# Patient Record
Sex: MALE | Race: WHITE | Employment: UNEMPLOYED | URBAN - METROPOLITAN AREA
[De-identification: names, ages, dates, MRNs, and addresses within clinical notes are randomized per-mention and may not be internally consistent; named-entity substitution may affect disease eponyms.]

---

## 2017-10-16 ENCOUNTER — APPOINTMENT (OUTPATIENT)
Dept: GENERAL RADIOLOGY | Age: 27
End: 2017-10-16
Attending: EMERGENCY MEDICINE
Payer: COMMERCIAL

## 2017-10-16 ENCOUNTER — OFFICE VISIT (OUTPATIENT)
Dept: INTERNAL MEDICINE CLINIC | Age: 27
End: 2017-10-16

## 2017-10-16 ENCOUNTER — HOSPITAL ENCOUNTER (EMERGENCY)
Age: 27
Discharge: HOME | End: 2017-10-16
Attending: EMERGENCY MEDICINE
Payer: COMMERCIAL

## 2017-10-16 VITALS
HEIGHT: 67 IN | DIASTOLIC BLOOD PRESSURE: 71 MMHG | RESPIRATION RATE: 21 BRPM | HEART RATE: 95 BPM | OXYGEN SATURATION: 99 % | TEMPERATURE: 102.8 F | WEIGHT: 217 LBS | BODY MASS INDEX: 34.06 KG/M2 | SYSTOLIC BLOOD PRESSURE: 123 MMHG

## 2017-10-16 VITALS
SYSTOLIC BLOOD PRESSURE: 124 MMHG | HEIGHT: 68 IN | DIASTOLIC BLOOD PRESSURE: 70 MMHG | WEIGHT: 217 LBS | BODY MASS INDEX: 32.89 KG/M2 | HEART RATE: 105 BPM | OXYGEN SATURATION: 99 % | TEMPERATURE: 98.6 F

## 2017-10-16 DIAGNOSIS — T78.40XA ALLERGIC REACTION, INITIAL ENCOUNTER: Primary | ICD-10-CM

## 2017-10-16 DIAGNOSIS — R04.0 EPISTAXIS: ICD-10-CM

## 2017-10-16 DIAGNOSIS — J06.9 UPPER RESPIRATORY TRACT INFECTION, UNSPECIFIED TYPE: Primary | ICD-10-CM

## 2017-10-16 PROBLEM — F95.2 TOURETTE'S: Status: ACTIVE | Noted: 2017-10-16

## 2017-10-16 PROBLEM — F31.9 BIPOLAR DISORDER (HCC): Status: ACTIVE | Noted: 2017-10-16

## 2017-10-16 PROBLEM — G47.00 INSOMNIA: Status: ACTIVE | Noted: 2017-10-16

## 2017-10-16 PROBLEM — H60.91 OTITIS EXTERNA OF RIGHT EAR: Status: ACTIVE | Noted: 2017-10-16

## 2017-10-16 PROBLEM — J30.9 ALLERGIC RHINITIS: Status: ACTIVE | Noted: 2017-10-16

## 2017-10-16 LAB
ALBUMIN SERPL-MCNC: 4.5 G/DL (ref 3.5–5)
ALBUMIN/GLOB SERPL: 1.2 {RATIO} (ref 1.1–2.2)
ALP SERPL-CCNC: 109 U/L (ref 45–117)
ALT SERPL-CCNC: 48 U/L (ref 12–78)
ANION GAP SERPL CALC-SCNC: 10 MMOL/L (ref 5–15)
APPEARANCE UR: CLEAR
AST SERPL-CCNC: 32 U/L (ref 15–37)
BASOPHILS # BLD: 0.1 K/UL (ref 0–0.1)
BASOPHILS NFR BLD: 0 % (ref 0–1)
BILIRUB SERPL-MCNC: 0.9 MG/DL (ref 0.2–1)
BILIRUB UR QL: NEGATIVE
BUN SERPL-MCNC: 9 MG/DL (ref 6–20)
BUN/CREAT SERPL: 8 (ref 12–20)
CALCIUM SERPL-MCNC: 9.7 MG/DL (ref 8.5–10.1)
CHLORIDE SERPL-SCNC: 101 MMOL/L (ref 97–108)
CO2 SERPL-SCNC: 28 MMOL/L (ref 21–32)
COLOR UR: NORMAL
CREAT SERPL-MCNC: 1.16 MG/DL (ref 0.7–1.3)
EOSINOPHIL # BLD: 0.2 K/UL (ref 0–0.4)
EOSINOPHIL NFR BLD: 2 % (ref 0–7)
ERYTHROCYTE [DISTWIDTH] IN BLOOD BY AUTOMATED COUNT: 12.4 % (ref 11.5–14.5)
FLUAV AG NPH QL IA: NEGATIVE
FLUBV AG NOSE QL IA: NEGATIVE
GLOBULIN SER CALC-MCNC: 3.8 G/DL (ref 2–4)
GLUCOSE SERPL-MCNC: 161 MG/DL (ref 65–100)
GLUCOSE UR STRIP.AUTO-MCNC: NEGATIVE MG/DL
HCT VFR BLD AUTO: 45.9 % (ref 36.6–50.3)
HGB BLD-MCNC: 16.3 G/DL (ref 12.1–17)
HGB UR QL STRIP: NEGATIVE
KETONES UR QL STRIP.AUTO: NEGATIVE MG/DL
LACTATE SERPL-SCNC: 2 MMOL/L (ref 0.4–2)
LEUKOCYTE ESTERASE UR QL STRIP.AUTO: NEGATIVE
LYMPHOCYTES # BLD: 2.7 K/UL (ref 0.8–3.5)
LYMPHOCYTES NFR BLD: 22 % (ref 12–49)
MCH RBC QN AUTO: 31 PG (ref 26–34)
MCHC RBC AUTO-ENTMCNC: 35.5 G/DL (ref 30–36.5)
MCV RBC AUTO: 87.3 FL (ref 80–99)
MONOCYTES # BLD: 0.9 K/UL (ref 0–1)
MONOCYTES NFR BLD: 7 % (ref 5–13)
NEUTS SEG # BLD: 8.7 K/UL (ref 1.8–8)
NEUTS SEG NFR BLD: 69 % (ref 32–75)
NITRITE UR QL STRIP.AUTO: NEGATIVE
PH UR STRIP: 6 [PH] (ref 5–8)
PLATELET # BLD AUTO: 339 K/UL (ref 150–400)
POTASSIUM SERPL-SCNC: 3 MMOL/L (ref 3.5–5.1)
PROT SERPL-MCNC: 8.3 G/DL (ref 6.4–8.2)
PROT UR STRIP-MCNC: NEGATIVE MG/DL
RBC # BLD AUTO: 5.26 M/UL (ref 4.1–5.7)
SODIUM SERPL-SCNC: 139 MMOL/L (ref 136–145)
SP GR UR REFRACTOMETRY: 1.01 (ref 1–1.03)
UROBILINOGEN UR QL STRIP.AUTO: 1 EU/DL (ref 0.2–1)
WBC # BLD AUTO: 12.6 K/UL (ref 4.1–11.1)

## 2017-10-16 PROCEDURE — 74011250636 HC RX REV CODE- 250/636: Performed by: EMERGENCY MEDICINE

## 2017-10-16 PROCEDURE — 81003 URINALYSIS AUTO W/O SCOPE: CPT | Performed by: EMERGENCY MEDICINE

## 2017-10-16 PROCEDURE — 99284 EMERGENCY DEPT VISIT MOD MDM: CPT

## 2017-10-16 PROCEDURE — 87804 INFLUENZA ASSAY W/OPTIC: CPT | Performed by: EMERGENCY MEDICINE

## 2017-10-16 PROCEDURE — 74011636637 HC RX REV CODE- 636/637: Performed by: EMERGENCY MEDICINE

## 2017-10-16 PROCEDURE — 83605 ASSAY OF LACTIC ACID: CPT | Performed by: EMERGENCY MEDICINE

## 2017-10-16 PROCEDURE — 36415 COLL VENOUS BLD VENIPUNCTURE: CPT | Performed by: EMERGENCY MEDICINE

## 2017-10-16 PROCEDURE — 87040 BLOOD CULTURE FOR BACTERIA: CPT | Performed by: EMERGENCY MEDICINE

## 2017-10-16 PROCEDURE — 80053 COMPREHEN METABOLIC PANEL: CPT | Performed by: EMERGENCY MEDICINE

## 2017-10-16 PROCEDURE — 93005 ELECTROCARDIOGRAM TRACING: CPT

## 2017-10-16 PROCEDURE — 74011250637 HC RX REV CODE- 250/637: Performed by: EMERGENCY MEDICINE

## 2017-10-16 PROCEDURE — 85025 COMPLETE CBC W/AUTO DIFF WBC: CPT | Performed by: EMERGENCY MEDICINE

## 2017-10-16 PROCEDURE — 71010 XR CHEST PORT: CPT

## 2017-10-16 PROCEDURE — 96360 HYDRATION IV INFUSION INIT: CPT

## 2017-10-16 RX ORDER — ACETAMINOPHEN 325 MG/1
650 TABLET ORAL
Status: COMPLETED | OUTPATIENT
Start: 2017-10-16 | End: 2017-10-16

## 2017-10-16 RX ORDER — PIMOZIDE 2 MG/1
1 TABLET ORAL DAILY
COMMUNITY
Start: 2017-07-17 | End: 2017-10-16 | Stop reason: SDUPTHER

## 2017-10-16 RX ORDER — SULFAMETHOXAZOLE AND TRIMETHOPRIM 400; 80 MG/1; MG/1
1 TABLET ORAL 2 TIMES DAILY
Qty: 14 TAB | Refills: 0 | Status: SHIPPED | OUTPATIENT
Start: 2017-10-16 | End: 2018-01-24 | Stop reason: SINTOL

## 2017-10-16 RX ORDER — GUANFACINE 4 MG/1
1 TABLET, EXTENDED RELEASE ORAL DAILY
COMMUNITY
Start: 2017-07-17 | End: 2021-09-30

## 2017-10-16 RX ORDER — PREDNISONE 20 MG/1
60 TABLET ORAL
Status: COMPLETED | OUTPATIENT
Start: 2017-10-16 | End: 2017-10-16

## 2017-10-16 RX ORDER — METHYLPREDNISOLONE 4 MG/1
TABLET ORAL
Qty: 1 DOSE PACK | Refills: 0 | Status: SHIPPED | OUTPATIENT
Start: 2017-10-16 | End: 2017-10-30 | Stop reason: SDUPTHER

## 2017-10-16 RX ORDER — DOXYCYCLINE HYCLATE 100 MG
100 TABLET ORAL 2 TIMES DAILY
Qty: 14 TAB | Refills: 0 | Status: SHIPPED | OUTPATIENT
Start: 2017-10-16 | End: 2017-10-23

## 2017-10-16 RX ORDER — SODIUM CHLORIDE 0.9 % (FLUSH) 0.9 %
5-10 SYRINGE (ML) INJECTION AS NEEDED
Status: DISCONTINUED | OUTPATIENT
Start: 2017-10-16 | End: 2017-10-16 | Stop reason: HOSPADM

## 2017-10-16 RX ADMIN — Medication 2 SPRAY: at 15:45

## 2017-10-16 RX ADMIN — PREDNISONE 60 MG: 20 TABLET ORAL at 14:14

## 2017-10-16 RX ADMIN — SODIUM CHLORIDE 2952 ML: 900 INJECTION, SOLUTION INTRAVENOUS at 14:14

## 2017-10-16 RX ADMIN — ACETAMINOPHEN 650 MG: 325 TABLET ORAL at 15:45

## 2017-10-16 NOTE — ED PROVIDER NOTES
[facility]  EMERGENCY DEPARTMENT HISTORY AND PHYSICAL EXAM       Date of Service: 10/16/2017   Patient Name: Ivy Ahn   YOB: 1990  Medical Record Number: [id number]    History of Presenting Illness     No chief complaint on file. History Provided By:  patient and EMS    Additional History:   Ivy Ahn is a 32 y.o. male with PMhx significant for Tourette's, ADHD and bipolar disorder who presents via EMS to the ED with NRB in place with cc of SOB, hives, cough, and throat swelling after taking a new abx at 10:30 AM. Pt was placed on Lorabid by his PCP for fever and other cold sxs. He did not have a CXR performed while at the office. Pt reports taking 2 tablets of Benadryl and used an albuterol inhaler at onset. EMS reports tachycardia, hives on both sides of his sternum, and difficulty breathing. EMS administered benadryl and 0.3 mg of epi en route with improvement of difficulty swallowing from 7/10 to 2/10. EMS reports stridor initially that resolved en route. He was also tachycardic initially at 137 bpm and has now lowered to 117 bpm.  He specifically denies any fevers, chills, nausea, vomiting, chest pain,  headache, rash, diarrhea, sweating or weight loss. Social Hx: - Tobacco, + EtOH, - Illicit Drugs    There are no other complaints, changes or physical findings at this time.     Primary Care Provider: Tina Wilder MD     Past History     Past Medical History:   Past Medical History:   Diagnosis Date    ADHD (attention deficit hyperactivity disorder)     Allergic rhinitis 10/16/2017    Bipolar disorder ([facility] [address] 10/16/2017    Insomnia 10/16/2017    Otitis externa of right ear 10/16/2017    Tourette's 10/16/2017        Past Surgical History:   Past Surgical History:   Procedure Laterality Date    HX HEENT      HX TONSIL AND ADENOIDECTOMY          Family History:   Family History   Problem Relation Age of Onset    Heart Disease Mother     No Known Problems Father         Social History:   Social History   Substance Use Topics    Smoking status: Never Smoker    Smokeless tobacco: Never Used    Alcohol use Yes        Allergies: Allergies   Allergen Reactions    Bactrim [Sulfamethoxazole-Trimethoprim] Anaphylaxis     Patient given Bactrim DS this morning for sinus infection. Sister called this afternoon patient is in ER with severe reaction; Dr. Hauser Reasons notified   Luke Powers [Loracarbef] Hives         Review of Systems   Review of Systems   Constitutional: Negative. Negative for activity change, appetite change, chills, fatigue, fever and unexpected weight change. HENT: Positive for nosebleeds and trouble swallowing. Negative for congestion, hearing loss, rhinorrhea, sneezing and voice change. Eyes: Negative. Negative for pain and visual disturbance. Respiratory: Positive for shortness of breath. Negative for apnea, cough, choking and chest tightness. Cardiovascular: Negative. Negative for chest pain and palpitations. Gastrointestinal: Negative. Negative for abdominal distention, abdominal pain, blood in stool, diarrhea, nausea and vomiting. Genitourinary: Negative. Negative for difficulty urinating, flank pain, frequency and urgency. No discharge   Musculoskeletal: Negative. Negative for arthralgias, back pain, myalgias and neck stiffness. Skin: Negative for color change and rash. (+) Hives   Neurological: Negative. Negative for dizziness, seizures, syncope, speech difficulty, weakness, numbness and headaches. Hematological: Negative for adenopathy. Psychiatric/Behavioral: Negative. Negative for agitation, behavioral problems, dysphoric mood and suicidal ideas. The patient is not nervous/anxious. Physical Exam  Physical Exam   Constitutional: He is oriented to person, place, and time. He appears well-developed and well-nourished. No distress. HENT:   Head: Normocephalic and atraumatic. Mouth/Throat: Oropharynx is clear and moist. No oropharyngeal exudate. Dried blood on R nare   Eyes: Conjunctivae and EOM are normal. Pupils are equal, round, and reactive to light. Right eye exhibits no discharge. Left eye exhibits no discharge. Neck: Normal range of motion. Neck supple. Cardiovascular: Regular rhythm and intact distal pulses. Tachycardia present. Exam reveals no gallop and no friction rub. No murmur heard. Pulmonary/Chest: Effort normal and breath sounds normal. No respiratory distress. He has no wheezes. He has no rales. He exhibits no tenderness. Abdominal: Soft. Bowel sounds are normal. He exhibits no distension and no mass. There is no tenderness. There is no rebound and no guarding. Musculoskeletal: Normal range of motion. He exhibits no edema. Lymphadenopathy:     He has no cervical adenopathy. Neurological: He is alert and oriented to person, place, and time. No cranial nerve deficit. Coordination normal.   Skin: Skin is warm and dry. No rash noted. No erythema. Hives are resolving   Psychiatric: He has a normal mood and affect. Nursing note and vitals reviewed. Medical Decision Making   I am the first provider for this patient. I reviewed the vital signs, available nursing notes, past medical history, past surgical history, family history and social history. Old Medical Records: Old medical records. Provider Notes:   DDX: allergic reaction, epistaxis      ED Course:  1:58 PM   Initial assessment performed. The patients presenting problems have been discussed, and they are in agreement with the care plan formulated and outlined with them. I have encouraged them to ask questions as they arise throughout their visit. Progress Notes:   3:30 PM  The patient states that their symptoms have resolved and they feel much better. There are no other new complaints at this time. His questions have been answered.   We are awaiting final results and those will be reviewed with them when they become available. 5:04  PM  The patient states that their symptoms have resolved and they feel much better. There are no other new complaints at this time. His questions have been answered. We are awaiting final results and those will be reviewed with them when they become available. Diagnostic Study Results     Labs -      Recent Results (from the past 12 hour(s))   EKG, 12 LEAD, INITIAL    Collection Time: 10/16/17  2:19 PM   Result Value Ref Range    Ventricular Rate 113 BPM    Atrial Rate 113 BPM    P-R Interval 126 ms    QRS Duration 78 ms    Q-T Interval 330 ms    QTC Calculation (Bezet) 452 ms    Calculated P Axis 34 degrees    Calculated R Axis 13 degrees    Calculated T Axis 16 degrees    Diagnosis Sinus tachycardia  No previous ECGs available      LACTIC ACID    Collection Time: 10/16/17  2:38 PM   Result Value Ref Range    Lactic acid 2.0 0.4 - 2.0 MMOL/L   METABOLIC PANEL, COMPREHENSIVE    Collection Time: 10/16/17  2:38 PM   Result Value Ref Range    Sodium 139 136 - 145 mmol/L    Potassium 3.0 (L) 3.5 - 5.1 mmol/L    Chloride 101 97 - 108 mmol/L    CO2 28 21 - 32 mmol/L    Anion gap 10 5 - 15 mmol/L    Glucose 161 (H) 65 - 100 mg/dL    BUN 9 6 - 20 MG/DL    Creatinine 1.16 0.70 - 1.30 MG/DL    BUN/Creatinine ratio 8 (L) 12 - 20      GFR est AA >60 >60 ml/min/1.73m2    GFR est non-AA >60 >60 ml/min/1.73m2    Calcium 9.7 8.5 - 10.1 MG/DL    Bilirubin, total 0.9 0.2 - 1.0 MG/DL    ALT (SGPT) 48 12 - 78 U/L    AST (SGOT) 32 15 - 37 U/L    Alk.  phosphatase 109 45 - 117 U/L    Protein, total 8.3 (H) 6.4 - 8.2 g/dL    Albumin 4.5 3.5 - 5.0 g/dL    Globulin 3.8 2.0 - 4.0 g/dL    A-G Ratio 1.2 1.1 - 2.2     CBC WITH AUTOMATED DIFF    Collection Time: 10/16/17  2:38 PM   Result Value Ref Range    WBC 12.6 (H) 4.1 - 11.1 K/uL    RBC 5.26 4.10 - 5.70 M/uL    HGB 16.3 12.1 - 17.0 g/dL    HCT 45.9 36.6 - 50.3 %    MCV 87.3 80.0 - 99.0 FL    MCH 31.0 26.0 - 34.0 PG MCHC 35.5 30.0 - 36.5 g/dL    RDW 12.4 11.5 - 14.5 %    PLATELET 913 626 - 445 K/uL    NEUTROPHILS 69 32 - 75 %    LYMPHOCYTES 22 12 - 49 %    MONOCYTES 7 5 - 13 %    EOSINOPHILS 2 0 - 7 %    BASOPHILS 0 0 - 1 %    ABS. NEUTROPHILS 8.7 (H) 1.8 - 8.0 K/UL    ABS. LYMPHOCYTES 2.7 0.8 - 3.5 K/UL    ABS. MONOCYTES 0.9 0.0 - 1.0 K/UL    ABS. EOSINOPHILS 0.2 0.0 - 0.4 K/UL    ABS. BASOPHILS 0.1 0.0 - 0.1 K/UL   URINALYSIS W/ RFLX MICROSCOPIC    Collection Time: 10/16/17  3:41 PM   Result Value Ref Range    Color YELLOW/STRAW      Appearance CLEAR CLEAR      Specific gravity 1.009 1.003 - 1.030      pH (UA) 6.0 5.0 - 8.0      Protein NEGATIVE  NEG mg/dL    Glucose NEGATIVE  NEG mg/dL    Ketone NEGATIVE  NEG mg/dL    Bilirubin NEGATIVE  NEG      Blood NEGATIVE  NEG      Urobilinogen 1.0 0.2 - 1.0 EU/dL    Nitrites NEGATIVE  NEG      Leukocyte Esterase NEGATIVE  NEG     INFLUENZA A & B AG (RAPID TEST)    Collection Time: 10/16/17  3:41 PM   Result Value Ref Range    Influenza A Antigen NEGATIVE  NEG      Influenza B Antigen NEGATIVE  NEG         Radiologic Studies -  The following have been ordered and reviewed:    CXR Results  (Last 48 hours)               10/16/17 1432  XR CHEST PORT Final result    Impression:  IMPRESSION: No acute cardiopulmonary process seen. Narrative:  EXAM:  XR CHEST PORT       INDICATION:  Chest Pain. Clinical allergic reaction. COMPARISON:  None. FINDINGS: A portable AP radiograph of the chest was obtained at 1417 hours. The   patient is on a cardiac monitor. The lungs are clear. The cardiac and   mediastinal contours and pulmonary vascularity are normal.  The bones and soft   tissues are grossly within normal limits. Vital Signs-Reviewed the patient's vital signs.    Patient Vitals for the past 12 hrs:   Temp Pulse Resp BP SpO2   10/16/17 1715 - 95 21 123/71 99 %   10/16/17 1407 (!) 102.8 °F (39.3 °C) (!) 118 18 169/79 100 %       Medications Given in the ED:  Medications   sodium chloride (NS) flush 5-10 mL (not administered)   predniSONE (DELTASONE) tablet 60 mg (60 mg Oral Given 10/16/17 1414)   sodium chloride 0.9 % bolus infusion 2,952 mL (0 mL/kg × 98.4 kg IntraVENous IV Completed 10/16/17 1540)   phenylephrine (NEOSYNEPHRINE) 1 % nasal spray 2 Spray (2 Sprays Both Nostrils Given 10/16/17 1545)   acetaminophen (TYLENOL) tablet 650 mg (650 mg Oral Given 10/16/17 1545)       Pulse Oximetry Analysis - Normal 100% on NRB     Cardiac Monitor:   Rate: 118  Rhythm: Sinus Tachycardia        Diagnosis   Clinical Impression:   1. Allergic reaction, initial encounter    2. Epistaxis         Plan:  1:   Follow-up Information     Follow up With Details Comments Contact Info    C Amalia Bermudez MD Call in 2 days As needed, If symptoms worsen [address]  [phone]          2:   Current Discharge Medication List      START taking these medications    Details   methylPREDNISolone (MEDROL, ERASMO,) 4 mg tablet Take as directed  Qty: 1 Dose Pack, Refills: 0      doxycycline (VIBRA-TABS) 100 mg tablet Take 1 Tab by mouth two (2) times a day for 7 days. Qty: 14 Tab, Refills: 0           Return to ED if Worse    Disposition Note:  5:19 PM  Emmanuelle Garduno  results have been reviewed with him. He has been counseled regarding his diagnosis. He verbally conveys understanding and agreement of the signs, symptoms, diagnosis, treatment and prognosis and additionally agrees to follow up as recommended with Dr. Romina Cowan MD in 24 - 48 hours. He also agrees with the care-plan and conveys that all of his questions have been answered.   I have also put together some discharge instructions for him that include: 1) educational information regarding their diagnosis, 2) how to care for their diagnosis at home, as well a 3) list of reasons why they would want to return to the ED prior to their follow-up appointment, should their condition change.    _______________________________   Attestations:     Attestations: This note is prepared by Ish Moran, acting as Scribe for Ring Katt. Marilu Brady MD      The scribe's documentation has been prepared under my direction and personally reviewed by me in its entirety. I confirm that the note above accurately reflects all work, treatment, procedures, and medical decision making performed by me. Jay Brady MD    _______________________________

## 2017-10-16 NOTE — PROGRESS NOTES
This note will not be viewable in [address] Isidoro Briones is a 32 y.o. male and presents with Cold Symptoms  . Subjective:  Mr. Julian Patterson presents today with complaint of coryza congestion and drainage for the past 3 days. He notes he may have had a fever yesterday. He has had no chills or rigors. He has mild cough that is nonproductive. He denies any shortness of breath or pleuritic chest pain. He is not taking any medication for this. Past Medical History:   Diagnosis Date    ADHD (attention deficit hyperactivity disorder)     Allergic rhinitis 10/16/2017    Bipolar disorder ([address] 10/16/2017    Insomnia 10/16/2017    Otitis externa of right ear 10/16/2017    Tourette's 10/16/2017     Past Surgical History:   Procedure Laterality Date    HX HEENT      HX TONSIL AND ADENOIDECTOMY       Allergies   Allergen Reactions    Bactrim [Sulfamethoxazole-Trimethoprim] Anaphylaxis     Patient given Bactrim DS this morning for sinus infection. Sister called this afternoon patient is in ER with severe reaction; Dr. Vishnu Mohamud notified   Reba Lykristie [Loracarbef] Hives    Cefdinir Unknown (comments)     Current Outpatient Prescriptions   Medication Sig Dispense Refill    guanFACINE ER (INTUNIV) 4 mg Tb24 ER tablet Take 1 Tab by mouth daily.  trimethoprim-sulfamethoxazole (BACTRIM, SEPTRA)  mg per tablet Take 1 Tab by mouth two (2) times a day. 14 Tab 0    pimozide (ORAP) 1 mg Tab Take 4 mg by mouth.  guanfacine (TENEX) 2 mg Tab Take 2 mg by mouth.  LORazepam (ATIVAN) 0.5 mg tablet Take 0.5 mg by mouth.  benztropine (COGENTIN) 0.5 mg tablet Take 0.5 mg by mouth two (2) times a day.  methylPREDNISolone (MEDROL, ERASMO,) 4 mg tablet Take as directed 1 Dose Pack 0    doxycycline (VIBRA-TABS) 100 mg tablet Take 1 Tab by mouth two (2) times a day for 7 days.  14 Tab 0     Facility-Administered Medications Ordered in Other Visits   Medication Dose Route Frequency Provider Last Rate Last Dose    sodium chloride (NS) flush 5-10 mL  5-10 mL IntraVENous PRN Reza Desai MD         Social History     Social History    Marital status: SINGLE     Spouse name: N/A    Number of children: N/A    Years of education: N/A     Social History Main Topics    Smoking status: Never Smoker    Smokeless tobacco: Never Used    Alcohol use Yes    Drug use: No    Sexual activity: Not Asked     Other Topics Concern    None     Social History Narrative     Family History   Problem Relation Age of Onset    Heart Disease Mother     No Known Problems Father        Review of Systems  Constitutional: negative for fevers, chills, anorexia and weight loss  Eyes:   negative for visual disturbance and irritation  ENT:   Positive for some sinus congestion and post nasal drainage. Respiratory:  Positive for cough and chest congestion without wheezing  CV:   negative for chest pain, palpitations, lower extremity edema  GI:   negative for nausea, vomiting, diarrhea, abdominal pain,melena  Integumentary: negative for rash and pruritus  Neurological:  negative for headaches, dizziness, vertigo, memory problems and gait       Objective:  Visit Vitals    /70 (BP 1 Location: Left arm, BP Patient Position: Sitting)    Pulse (!) 105    Temp 98.6 °F (37 °C)    Ht 5' 7.5\" (1.715 m)    Wt 217 lb (98.4 kg)    SpO2 99%    BMI 33.49 kg/m2     Physical Exam:   General appearance - alert, ill appearing, and in no distress  Mental status - alert, oriented to person, place, and time  EYE-HENRY, EOMI, conjuctiva clear. No lid swelling or purulent drainage  ENT- TM's clear without A/F level.  Pharynx slightly erythematous with drainage noted  Nose - normal and patent, no erythema,  Neck - supple, no significant adenopathy   Chest - Coarse upper airway rhonchi present without wheezing   Heart - normal rate, regular rhythm, normal S1, S2, no murmurs, rubs, clicks or gallops   Skin-No rash appreciated  Neuro -alert, oriented, normal speech, no focal findings. Assessment/Plan:  Diagnoses and all orders for this visit:    1. Upper respiratory tract infection, unspecified type    Other orders  -     trimethoprim-sulfamethoxazole (BACTRIM, SEPTRA)  mg per tablet; Take 1 Tab by mouth two (2) times a day. Other Instructions:  Mucinex as directed    Increase po fluids    Follow-up Disposition:  Return if symptoms worsen or fail to improve. I have reviewed with the patient details of the assessment and plan and all questions were answered. Relevent patient education was performed. An After Visit Summary was printed and given to the patient. Fan Knight MD    Addendum:    After the patient's visit today he apparently had a reaction to the Bactrim as above and went to the emergency room for evaluation. Further plan pending evaluation in the ER. His allergy was marked on the chart.

## 2017-10-16 NOTE — MR AVS SNAPSHOT
Visit Information Date & Time Provider Department Dept. Phone Encounter #  
 10/16/2017  9:40 AM ANUJ Cook MD [address] [place] [address] [phone] [id number] Follow-up Instructions Return if symptoms worsen or fail to improve. Upcoming Health Maintenance Date Due DTaP/Tdap/Td series (1 - Tdap) 6/26/2009 INFLUENZA AGE 9 TO ADULT 8/1/2017 Allergies as of 10/16/2017  Review Complete On: 10/16/2017 By: Charity Valdes MD  
  
 Severity Noted Reaction Type Reactions Lorabid [Loracarbef]  05/26/2012    Hives Current Immunizations  Never Reviewed No immunizations on file. Not reviewed this visit You Were Diagnosed With   
  
 Codes Comments Upper respiratory tract infection, unspecified type    -  Primary ICD-10-CM: J06.9 ICD-9-CM: 465.9 Vitals BP Pulse Temp Height(growth percentile) Weight(growth percentile) SpO2  
 124/70 (BP 1 Location: Left arm, BP Patient Position: Sitting) (!) 105 98.6 °F (37 °C) 5' 7.5\" (1.715 m) 217 lb (98.4 kg) 99% BMI Smoking Status 33.49 kg/m2 Never Smoker BMI and BSA Data Body Mass Index Body Surface Area  
 33.49 kg/m 2 2.16 m 2 Preferred Pharmacy Pharmacy Name Phone [address] [place], [address] [facility] [phone] Your Updated Medication List  
  
   
This list is accurate as of: 10/16/17 10:19 AM.  Always use your most recent med list.  
  
  
  
  
 benztropine 0.5 mg tablet Commonly known as:  COGENTIN Take 0.5 mg by mouth two (2) times a day. LORazepam 0.5 mg tablet Commonly known as:  ATIVAN Take 0.5 mg by mouth. ORAP 1 mg tablet Generic drug:  pimozide Take 4 mg by mouth. * guanFACINE ER 4 mg Tb24 ER tablet Commonly known as:  INTUNIV Take 1 Tab by mouth daily. * TENEX 2 mg IR tablet Generic drug:  guanFACINE IR Take 2 mg by mouth. trimethoprim-sulfamethoxazole  mg per tablet Commonly known as:  Elza Burn Take 1 Tab by mouth two (2) times a day. * Notice: This list has 2 medication(s) that are the same as other medications prescribed for you. Read the directions carefully, and ask your doctor or other care provider to review them with you. Prescriptions Sent to Pharmacy Refills  
 trimethoprim-sulfamethoxazole (BACTRIM, SEPTRA)  mg per tablet 0 Sig: Take 1 Tab by mouth two (2) times a day. Class: Normal  
 Pharmacy: [facility] [place], [address] [place] [address] Cari Oliverarini Ph #: [phone] Route: Oral  
  
Follow-up Instructions Return if symptoms worsen or fail to improve. Introducing [facility]! [employer] introduces [employer] patient portal. Now you can access parts of your medical record, email your doctor's office, and request medication refills online. 1. In your internet browser, go to [email or web address] 2. Click on the First Time User? Click Here link in the Sign In box. You will see the New Member Sign Up page. 3. Enter your [employer] Access Code exactly as it appears below. You will not need to use this code after youve completed the sign-up process. If you do not sign up before the expiration date, you must request a new code. · [employer] Access Code: [email or web address] Expires: 1/14/2018  9:20 AM 
 
4. Enter the last four digits of your Social Security Number (xxxx) and Date of Birth (mm/dd/yyyy) as indicated and click Submit. You will be taken to the next sign-up page. 5. Create a [employer] ID. This will be your [employer] login ID and cannot be changed, so think of one that is secure and easy to remember. 6. Create a [employer] password. You can change your password at any time. 7. Enter your Password Reset Question and Answer. This can be used at a later time if you forget your password. 8. Enter your e-mail address. You will receive e-mail notification when new information is available in [address] 9. Click Sign Up. You can now view and download portions of your medical record. 10. Click the Download Summary menu link to download a portable copy of your medical information. If you have questions, please visit the Frequently Asked Questions section of the [employer] website. Remember, [employer] is NOT to be used for urgent needs. For medical emergencies, dial 911. Now available from your iPhone and Android! Please provide this summary of care documentation to your next provider. Your primary care clinician is listed as ANUJ Kennedy. If you have any questions after today's visit, please call [phone].

## 2017-10-16 NOTE — PROGRESS NOTES
C/o sore throat, nasal drainage, cough been going on for 2 days with fever yesterday at 100.1    1. Have you been to the ER, urgent care clinic since your last visit? Hospitalized since your last visit? No    2. Have you seen or consulted any other health care providers outside of the [address] [place] [address] since your last visit? Include any pap smears or colon screening.  No

## 2017-10-16 NOTE — ED NOTES
1400-Assumed care of patient. Patient is alert and oriented, does not appear to be in distress. Patient arrives via EMS with c/o allergic reaction PTA to antibiotic. EMS gave benadryl and epi for respiratory distress. Monitor x 3 applied with ST. Patient positioned for comfort with call bell within reach. Side rails up for safety. Provider to evaluate patient.

## 2017-10-16 NOTE — LETTER
NOTIFICATION RETURN TO WORK / SCHOOL 
 
10/16/2017 10:18 AM 
 
Mr. Selene Medeiros 
[address] [facility] [address] [address] To Whom It May Concern: 
 
Selene Medeiros is currently under the care of Hansa Lamb. He will return to work on: 10/21/17 If there are questions or concerns please have the patient contact our office. Sincerely, Ross Umaña MD

## 2017-10-16 NOTE — DISCHARGE INSTRUCTIONS
Nosebleeds: Care Instructions  Your Care Instructions    Nosebleeds are common, especially if you have colds or allergies. Many things can cause a nosebleed. Some nosebleeds stop on their own with pressure. Others need packing. Some get cauterized (sealed). If you have gauze or other packing materials in your nose, you will need to follow up with your doctor to have the packing removed. You may need more treatment if you get nosebleeds a lot. The doctor has checked you carefully, but problems can develop later. If you notice any problems or new symptoms, get medical treatment right away. Follow-up care is a key part of your treatment and safety. Be sure to make and go to all appointments, and call your doctor if you are having problems. It's also a good idea to know your test results and keep a list of the medicines you take. How can you care for yourself at home? · If you get another nosebleed:  ¨ Sit up and tilt your head slightly forward. This keeps blood from going down your throat. ¨ Use your thumb and index finger to pinch your nose shut for 10 minutes. Use a clock. Do not check to see if the bleeding has stopped before the 10 minutes are up. If the bleeding has not stopped, pinch your nose shut for another 10 minutes. ¨ When the bleeding has stopped, try not to pick, rub, or blow your nose for 12 hours. Avoiding these things helps keep your nose from bleeding again. · If your doctor prescribed antibiotics, take them as directed. Do not stop taking them just because you feel better. You need to take the full course of antibiotics. To prevent nosebleeds  · Do not blow your nose too hard. · Try not to lift or strain after a nosebleed. · Raise your head on a pillow while you sleep. · Put a thin layer of a saline- or water-based nasal gel, such as NasoGel, inside your nose. Put it on the septum, which divides your nostrils. This will prevent dryness that can cause nosebleeds.   · Use a vaporizer or humidifier to add moisture to your bedroom. Follow the directions for cleaning the machine. · Do not use aspirin, ibuprofen (Advil, Motrin), or naproxen (Aleve) for 36 to 48 hours after a nosebleed unless your doctor tells you to. You can use acetaminophen (Tylenol) for pain relief. · Talk to your doctor about stopping any other medicines you are taking. Some medicines may make you more likely to get a nosebleed. · Do not use cold medicines or nasal sprays without first talking to your doctor. They can make your nose dry. When should you call for help? Call 911 anytime you think you may need emergency care. For example, call if:  · You passed out (lost consciousness). Call your doctor now or seek immediate medical care if:  · You get another nosebleed and your nose is still bleeding after you have applied pressure 3 times for 10 minutes each time (30 minutes total). · There is a lot of blood running down the back of your throat even after you pinch your nose and tilt your head forward. · You have a fever. · You have sinus pain. Watch closely for changes in your health, and be sure to contact your doctor if:  · You get nosebleeds often, even if they stop. · You do not get better as expected. Where can you learn more? Go to http://yue-sanjeev.info/. Enter S156 in the search box to learn more about \"Nosebleeds: Care Instructions. \"  Current as of: March 20, 2017  Content Version: 11.3  © 1867-0170 [employer]. Care instructions adapted under license by [employer] (which disclaims liability or warranty for this information). If you have questions about a medical condition or this instruction, always ask your healthcare professional. [place] [address] any warranty or liability for your use of this information.        Allergic Reaction: Care Instructions  Your Care Instructions  An allergic reaction is an excessive response from your immune system to a medicine, chemical, food, insect bite, or other substance. A reaction can range from mild to life-threatening. Some people have a mild rash, hives, and itching or stomach cramps. In severe reactions, swelling of your tongue and throat can close up your airway so that you cannot breathe. Follow-up care is a key part of your treatment and safety. Be sure to make and go to all appointments, and call your doctor if you are having problems. It's also a good idea to know your test results and keep a list of the medicines you take. How can you care for yourself at home? · If you know what caused your allergic reaction, be sure to avoid it. Your allergy may become more severe each time you have a reaction. · Take an over-the-counter antihistamine, such as cetirizine (Zyrtec) or loratadine (Claritin), to treat mild symptoms. Read and follow directions on the label. Some antihistamines can make you feel sleepy. Do not give antihistamines to a child unless you have checked with your doctor first. Mild symptoms include sneezing or an itchy or runny nose; an itchy mouth; a few hives or mild itching; and mild nausea or stomach discomfort. · Do not scratch hives or a rash. Put a cold, moist towel on them or take cool baths to relieve itching. Put ice packs on hives, swelling, or insect stings for 10 to 15 minutes at a time. Put a thin cloth between the ice pack and your skin. Do not take hot baths or showers. They will make the itching worse. · Your doctor may prescribe a shot of epinephrine to carry with you in case you have a severe reaction. Learn how to give yourself the shot and keep it with you at all times. Make sure it is not . · Go to the emergency room every time you have a severe reaction, even if you have used your shot of epinephrine and are feeling better. Symptoms can come back after a shot. · Wear medical alert jewelry that lists your allergies. You can buy this at most [employer].   · If your child has a severe allergy, make sure that his or her teachers, babysitters, coaches, and other caregivers know about the allergy. They should have an epinephrine shot, know how and when to give it, and have a plan to take your child to the hospital.  When should you call for help? Give an epinephrine shot if:  · You think you are having a severe allergic reaction. · You have symptoms in more than one body area, such as mild nausea and an itchy mouth. After giving an epinephrine shot call 911, even if you feel better. Call 911 if:  · You have symptoms of a severe allergic reaction. These may include:  ¨ Sudden raised, red areas (hives) all over your body. ¨ Swelling of the throat, mouth, lips, or tongue. ¨ Trouble breathing. ¨ Passing out (losing consciousness). Or you may feel very lightheaded or suddenly feel weak, confused, or restless. · You have been given an epinephrine shot, even if you feel better. Call your doctor now or seek immediate medical care if:  · You have symptoms of an allergic reaction, such as:  ¨ A rash or hives (raised, red areas on the skin). ¨ Itching. ¨ Swelling. ¨ Belly pain, nausea, or vomiting. Watch closely for changes in your health, and be sure to contact your doctor if:  · You do not get better as expected. Where can you learn more? Go to http://yue-sanjeev.info/. Enter G115 in the search box to learn more about \"Allergic Reaction: Care Instructions. \"  Current as of: April 3, 2017  Content Version: 11.3  © 6095-6521 [employer]. Care instructions adapted under license by [employer] (which disclaims liability or warranty for this information). If you have questions about a medical condition or this instruction, always ask your healthcare professional. [address] any warranty or liability for your use of this information.

## 2017-10-17 LAB
ATRIAL RATE: 113 BPM
CALCULATED P AXIS, ECG09: 34 DEGREES
CALCULATED R AXIS, ECG10: 13 DEGREES
CALCULATED T AXIS, ECG11: 16 DEGREES
DIAGNOSIS, 93000: NORMAL
P-R INTERVAL, ECG05: 126 MS
Q-T INTERVAL, ECG07: 330 MS
QRS DURATION, ECG06: 78 MS
QTC CALCULATION (BEZET), ECG08: 452 MS
VENTRICULAR RATE, ECG03: 113 BPM

## 2017-10-22 LAB
BACTERIA SPEC CULT: NORMAL
BACTERIA SPEC CULT: NORMAL
SERVICE CMNT-IMP: NORMAL
SERVICE CMNT-IMP: NORMAL

## 2017-10-30 ENCOUNTER — OFFICE VISIT (OUTPATIENT)
Dept: INTERNAL MEDICINE CLINIC | Age: 27
End: 2017-10-30

## 2017-10-30 VITALS
HEIGHT: 67 IN | RESPIRATION RATE: 18 BRPM | WEIGHT: 217.2 LBS | HEART RATE: 91 BPM | DIASTOLIC BLOOD PRESSURE: 75 MMHG | SYSTOLIC BLOOD PRESSURE: 134 MMHG | OXYGEN SATURATION: 98 % | TEMPERATURE: 99.1 F | BODY MASS INDEX: 34.09 KG/M2

## 2017-10-30 DIAGNOSIS — J01.01 ACUTE RECURRENT MAXILLARY SINUSITIS: Primary | ICD-10-CM

## 2017-10-30 RX ORDER — DOXYCYCLINE 100 MG/1
100 CAPSULE ORAL 2 TIMES DAILY
Qty: 20 CAP | Refills: 0 | Status: SHIPPED | OUTPATIENT
Start: 2017-10-30 | End: 2021-12-03 | Stop reason: SDUPTHER

## 2017-10-30 RX ORDER — METHYLPREDNISOLONE 4 MG/1
TABLET ORAL
Qty: 1 DOSE PACK | Refills: 0 | Status: SHIPPED | OUTPATIENT
Start: 2017-10-30 | End: 2018-04-03 | Stop reason: ALTCHOICE

## 2017-10-30 NOTE — PROGRESS NOTES
This note will not be viewable in [address] Laura Strickland is a 32 y.o. male and presents with Hospital Follow Up [facility], [facility] ER for allergic reaction to antibiotic states he had hives; SOB, fever, throat swelling) and Cold Symptoms (stuffy nose, left ear pressure, intermittent dry cough; Room 7)  . Subjective:  Jin Gan presents today for follow-up after having to go to the emergency room when he started on Bactrim for his recent upper respiratory infection. In immediate allergic reaction consisting of swelling of his throat and airway and hives. He had a significant cough and was seen in the emergency room and given steroids. He developed a nosebleed secondary to the coughing as well. Chest x-ray was normal.  He did respond to steroids and was placed on doxycycline. He states his symptoms did resolve completely however after about a week he started to develop sinus pressure congestion and drainage once again. The drainage is mostly clear in color. He states he has never had seasonal allergies. Past Medical History:   Diagnosis Date    ADHD (attention deficit hyperactivity disorder)     Allergic rhinitis 10/16/2017    Bipolar disorder ([address] 10/16/2017    Insomnia 10/16/2017    Otitis externa of right ear 10/16/2017    Tourette's 10/16/2017     Past Surgical History:   Procedure Laterality Date    HX HEENT      HX TONSIL AND ADENOIDECTOMY       Allergies   Allergen Reactions    Bactrim [Sulfamethoxazole-Trimethoprim] Anaphylaxis     Patient given Bactrim DS this morning for sinus infection.   Sister called this afternoon patient is in ER with severe reaction; Dr. Gerda Najera notified    Sulfa (Sulfonamide Antibiotics) Anaphylaxis    Lorabid [Loracarbef] Hives    Cefdinir Unknown (comments)     Current Outpatient Prescriptions   Medication Sig Dispense Refill    methylPREDNISolone (MEDROL, ERASMO,) 4 mg tablet Take as directed 1 Dose Pack 0    doxycycline (VIBRAMYCIN) 100 mg capsule Take 1 Cap by mouth two (2) times a day for 10 days. 20 Cap 0    guanFACINE ER (INTUNIV) 4 mg Tb24 ER tablet Take 1 Tab by mouth daily.  pimozide (ORAP) 1 mg Tab Take 4 mg by mouth.  LORazepam (ATIVAN) 0.5 mg tablet Take 0.5 mg by mouth.  benztropine (COGENTIN) 0.5 mg tablet Take 0.5 mg by mouth two (2) times a day.  trimethoprim-sulfamethoxazole (BACTRIM, SEPTRA)  mg per tablet Take 1 Tab by mouth two (2) times a day. (Patient not taking: Reported on 10/30/2017) 14 Tab 0    guanfacine (TENEX) 2 mg Tab Take 2 mg by mouth.          Social History     Social History    Marital status: SINGLE     Spouse name: N/A    Number of children: N/A    Years of education: N/A     Social History Main Topics    Smoking status: Never Smoker    Smokeless tobacco: Never Used    Alcohol use Yes    Drug use: No    Sexual activity: Not Asked     Other Topics Concern    None     Social History Narrative     Family History   Problem Relation Age of Onset    Heart Disease Mother     No Known Problems Father        Review of Systems  Constitutional:  negative for fevers, chills, anorexia and weight loss  Eyes:    negative for visual disturbance and irritation  ENT:    negative for tinnitus,sore throat,.hoarseness, positive for nasal congestion  Respiratory:     negative for cough, hemoptysis, dyspnea,wheezing  CV:    negative for chest pain, palpitations, lower extremity edema  GI:    negative for nausea, vomiting, diarrhea, abdominal pain,melena  Endo:               negative for polyuria,polydipsia,polyphagia,heat intolerance  Genitourinary : negative for frequency, dysuria and hematuria  Integumentary: negative for rash and pruritus  Hematologic:   negative for easy bruising and gum/nose bleeding  Musculoskel:  negative for myalgias, arthralgias, back pain, muscle weakness, joint pain  Neurological:   negative for headaches, dizziness, vertigo, memory problems and gait   Behavl/Psych:  negative for feelings of anxiety, depression, mood changes  ROS otherwise negative      Objective:  Visit Vitals    /75 (BP 1 Location: Right arm, BP Patient Position: Sitting)    Pulse 91    Temp 99.1 °F (37.3 °C) (Oral)    Resp 18    Ht 5' 7\" (1.702 m)    Wt 217 lb 3.2 oz (98.5 kg)    SpO2 98%    BMI 34.02 kg/m2     Physical Exam:   General appearance - alert, well appearing, and in no distress  Mental status - alert, oriented to person, place, and time  EYE-HENRY, EOMI, fundi normal, corneas normal, no foreign bodies  ENT-ENT exam normal, no neck nodes or sinus tenderness  Nose -nares are erythematous and boggy bilaterally  Mouth - mucous membranes moist, pharynx normal without lesions  Neck - supple, no significant adenopathy   Chest - clear to auscultation, no wheezes, rales or rhonchi, symmetric air entry   Heart - normal rate, regular rhythm, normal S1, S2, no murmurs, rubs, clicks or gallops   Abdomen - soft, nontender, nondistended, no masses or organomegaly  Lymph- no adenopathy palpable  Ext-peripheral pulses normal, no pedal edema, no clubbing or cyanosis  Skin-Warm and dry. no hyperpigmentation, vitiligo, or suspicious lesions  Neuro -alert, oriented, normal speech, no focal findings or movement disorder noted      Assessment/Plan:  Diagnoses and all orders for this visit:    1. Acute recurrent maxillary sinusitis    Other orders  -     methylPREDNISolone (MEDROL, ERASMO,) 4 mg tablet; Take as directed  -     doxycycline (VIBRAMYCIN) 100 mg capsule; Take 1 Cap by mouth two (2) times a day for 10 days. ICD-10-CM ICD-9-CM    1. Acute recurrent maxillary sinusitis J01.01 461.0        Plan:    Discussed the fact that this appears to be seasonal may be allergy related. He should respond to steroid Dosepak. Consider over-the-counter antihistamine in the future if his symptoms recur. Follow-up Disposition:  Return if symptoms worsen or fail to improve.     I have reviewed with the patient details of the assessment and plan and all questions were answered. Relevent patient education was performed. Verbal and/or written instructions (see AVS) provided. The most recent lab findings were reviewed with the patient. Plan was discussed with patient who verbally expressed understanding. An After Visit Summary was printed and given to the patient.     Heather Cr MD

## 2017-10-30 NOTE — MR AVS SNAPSHOT
Visit Information Date & Time Provider Department Dept. Phone Encounter #  
 10/30/2017 11:00 AM C [facility] L[phone] [id number] Follow-up Instructions Return if symptoms worsen or fail to improve. Upcoming Health Maintenance Date Due DTaP/Tdap/Td series (1 - Tdap) 6/26/2009 INFLUENZA AGE 9 TO ADULT 8/1/2017 Allergies as of 10/30/2017  Review Complete On: 78/23/1383 By: Georgia Cerda RN Severity Noted Reaction Type Reactions Bactrim [Sulfamethoxazole-trimethoprim] High 10/16/2017    Anaphylaxis Patient given Bactrim DS this morning for sinus infection. Sister called this afternoon patient is in ER with severe reaction; Dr. Gabby Crabtree notified Sulfa (Sulfonamide Antibiotics) High 10/30/2017   Not Verified Anaphylaxis Lorabid [Loracarbef]  05/26/2012    Hives Cefdinir Low 10/16/2017    Unknown (comments) Current Immunizations  Never Reviewed No immunizations on file. Not reviewed this visit You Were Diagnosed With   
  
 Codes Comments Acute recurrent maxillary sinusitis    -  Primary ICD-10-CM: J01.01 
ICD-9-CM: 461.0 Vitals BP Pulse Temp Resp Height(growth percentile) Weight(growth percentile) 134/75 (BP 1 Location: Right arm, BP Patient Position: Sitting) 91 99.1 °F (37.3 °C) (Oral) 18 5' 7\" (1.702 m) 217 lb 3.2 oz (98.5 kg) SpO2 BMI Smoking Status 98% 34.02 kg/m2 Never Smoker Vitals History BMI and BSA Data Body Mass Index Body Surface Area 34.02 kg/m 2 2.16 m 2 Preferred Pharmacy Pharmacy Name Phone Paulina Bragg [address] [place] [address] Dr [facility]appell, [address] [phone] Your Updated Medication List  
  
   
This list is accurate as of: 10/30/17 11:38 AM.  Always use your most recent med list.  
  
  
  
  
 benztropine 0.5 mg tablet Commonly known as:  COGENTIN  
 Take 0.5 mg by mouth two (2) times a day. doxycycline 100 mg capsule Commonly known as:  VIBRAMYCIN Take 1 Cap by mouth two (2) times a day for 10 days. LORazepam 0.5 mg tablet Commonly known as:  ATIVAN Take 0.5 mg by mouth.  
  
 methylPREDNISolone 4 mg tablet Commonly known as:  MEDROL (ERASMO) Take as directed ORAP 1 mg tablet Generic drug:  pimozide Take 4 mg by mouth. * guanFACINE ER 4 mg Tb24 ER tablet Commonly known as:  INTUNIV Take 1 Tab by mouth daily. * TENEX 2 mg IR tablet Generic drug:  guanFACINE IR Take 2 mg by mouth. trimethoprim-sulfamethoxazole  mg per tablet Commonly known as:  Silver Jerad Take 1 Tab by mouth two (2) times a day. * Notice: This list has 2 medication(s) that are the same as other medications prescribed for you. Read the directions carefully, and ask your doctor or other care provider to review them with you. Prescriptions Sent to Pharmacy Refills  
 methylPREDNISolone (MEDROL, ERASMO,) 4 mg tablet 0 Sig: Take as directed Class: Normal  
 Pharmacy: [address] Dr Chappell, [address] [place] [address] Ph #: [phone]  
 doxycycline (VIBRAMYCIN) 100 mg capsule 0 Sig: Take 1 Cap by mouth two (2) times a day for 10 days. Class: Normal  
 Pharmacy: [address] Chappell, [address]  [address] Ph #: [phone] Route: Oral  
  
Follow-up Instructions Return if symptoms worsen or fail to improve. Introducing [facility]! [facility] introduces Face.com patient portal. Now you can access parts of your medical record, email your doctor's office, and request medication refills online. 1. In your internet browser, go to [email or web address]. [email or web address] 2. Click on the First Time User? Click Here link in the Sign In box. You will see the New Member Sign Up page. 3. Enter your [employer] Access Code exactly as it appears below. You will not need to use this code after youve completed the sign-up process. If you do not sign up before the expiration date, you must request a new code. · [employer]afham Access Code: [email or web address] Expires: 1/14/2018  9:20 AM 
 
4. Enter the last four digits of your Social Security Number (xxxx) and Date of Birth (mm/dd/yyyy) as indicated and click Submit. You will be taken to the next sign-up page. 5. Create a Nafham ID. This will be your Nafham login ID and cannot be changed, so think of one that is secure and easy to remember. 6. Create a Nafham password. You can change your password at any time. 7. Enter your Password Reset Question and Answer. This can be used at a later time if you forget your password. 8. Enter your e-mail address. You will receive e-mail notification when new information is available in [employer]. 9. Click Sign Up. You can now view and download portions of your medical record. 10. Click the Download Summary menu link to download a portable copy of your medical information. If you have questions, please visit the Frequently Asked Questions section of the Nafham website. Remember, Nafham is NOT to be used for urgent needs. For medical emergencies, dial 911. Now available from your iPhone and Android! Please provide this summary of care documentation to your next provider. Your primary care clinician is listed as ANUJ Kennedy. If you have any questions after today's visit, please call [phone].

## 2017-10-30 NOTE — PROGRESS NOTES
Identified pt with two pt identifiers(name and ). Reviewed record in preparation for visit and have obtained necessary documentation. Chief Complaint   Patient presents with   [facility] Follow Up     [id number] [facility] states he had hives; SOB, fever, throat swelling    Cold Symptoms     stuffy nose, left ear pressure, intermittent dry cough; Room 7        Health Maintenance Due   Topic    DTaP/Tdap/Td series (1 - Tdap)    INFLUENZA AGE 9 TO ADULT         Coordination of Care Questionnaire:  :   1) Have you been to an emergency room, urgent care clinic since your last visit? [facility] [id number]  Hospitalized since your last visit? no             2. Have seen or consulted any other health care provider since your last visit? NO  If yes, where when, and reason for visit? 3) Do you have an Advanced Directive/ Living Will in place? NO  If yes, do we have a copy on file NO  If no, would you like information NO    Patient is accompanied by self I have received verbal consent from Michelet Hoffman to discuss any/all medical information while they are present in the room.

## 2018-01-24 ENCOUNTER — OFFICE VISIT (OUTPATIENT)
Dept: INTERNAL MEDICINE CLINIC | Age: 28
End: 2018-01-24

## 2018-01-24 VITALS
OXYGEN SATURATION: 98 % | SYSTOLIC BLOOD PRESSURE: 136 MMHG | DIASTOLIC BLOOD PRESSURE: 85 MMHG | TEMPERATURE: 98.3 F | WEIGHT: 210 LBS | HEIGHT: 67 IN | HEART RATE: 100 BPM | BODY MASS INDEX: 32.96 KG/M2

## 2018-01-24 DIAGNOSIS — J06.9 UPPER RESPIRATORY TRACT INFECTION, UNSPECIFIED TYPE: Primary | ICD-10-CM

## 2018-01-24 RX ORDER — DOXYCYCLINE 100 MG/1
100 TABLET ORAL 2 TIMES DAILY
Qty: 14 TAB | Refills: 0 | Status: SHIPPED | OUTPATIENT
Start: 2018-01-24 | End: 2018-02-07

## 2018-01-24 NOTE — MR AVS SNAPSHOT
[facility] [address] [id number] [phone] Patient: Ace Nash MRN: [id number] :[id number] Visit Information Date & Time Provider Department Dept. Phone Encounter #  
 2018  9:15 AM Jerry Ybarra NP [facility] [phone] [id number] Follow-up Instructions Return if symptoms worsen or fail to improve. Upcoming Health Maintenance Date Due DTaP/Tdap/Td series (1 - Tdap) 2009 Influenza Age 5 to Adult 2017 Allergies as of 2018  Review Complete On: 2018 By: Jerry Ybarra NP Severity Noted Reaction Type Reactions Bactrim [Sulfamethoxazole-trimethoprim] High 10/16/2017    Anaphylaxis Patient given Bactrim DS this morning for sinus infection. Sister called this afternoon patient is in ER with severe reaction; Dr. Gideon Palacio notified Sulfa (Sulfonamide Antibiotics) High 10/30/2017   Not Verified Anaphylaxis Lorabid [Loracarbef]  2012    Hives Cefdinir Low 10/16/2017    Unknown (comments) Current Immunizations  Never Reviewed No immunizations on file. Not reviewed this visit You Were Diagnosed With   
  
 Codes Comments Upper respiratory tract infection, unspecified type    -  Primary ICD-10-CM: J06.9 ICD-9-CM: 465.9 Vitals BP Pulse Temp Height(growth percentile) Weight(growth percentile) SpO2  
 136/85 (BP 1 Location: Left arm, BP Patient Position: Sitting) 100 98.3 °F (36.8 °C) (Oral) 5' 7\" (1.702 m) 210 lb (95.3 kg) 98% BMI Smoking Status 32.89 kg/m2 Never Smoker BMI and BSA Data Body Mass Index Body Surface Area  
 32.89 kg/m 2 2.12 m 2 Preferred Pharmacy Pharmacy Name Phone [facility] [address], [address] [place] [address]  [address] [phone] Your Updated Medication List  
  
   
 This list is accurate as of: 1/24/18  9:38 AM.  Always use your most recent med list.  
  
  
  
  
 benztropine 0.5 mg tablet Commonly known as:  COGENTIN Take 0.5 mg by mouth two (2) times a day. LORazepam 0.5 mg tablet Commonly known as:  ATIVAN Take 0.5 mg by mouth.  
  
 methylPREDNISolone 4 mg tablet Commonly known as:  MEDROL (ERASMO) Take as directed ORAP 1 mg tablet Generic drug:  pimozide Take 4 mg by mouth. * guanFACINE ER 4 mg Tb24 ER tablet Commonly known as:  INTUNIV Take 1 Tab by mouth daily. * TENEX 2 mg IR tablet Generic drug:  guanFACINE IR Take 2 mg by mouth. * Notice: This list has 2 medication(s) that are the same as other medications prescribed for you. Read the directions carefully, and ask your doctor or other care provider to review them with you. Follow-up Instructions Return if symptoms worsen or fail to improve. Patient Instructions Upper Respiratory Infection (Cold): Care Instructions Your Care Instructions An upper respiratory infection, or URI, is an infection of the nose, sinuses, or throat. URIs are spread by coughs, sneezes, and direct contact. The common cold is the most frequent kind of URI. The flu and sinus infections are other kinds of URIs. Almost all URIs are caused by viruses. Antibiotics won't cure them. But you can treat most infections with home care. This may include drinking lots of fluids and taking over-the-counter pain medicine. You will probably feel better in 4 to 10 days. The doctor has checked you carefully, but problems can develop later. If you notice any problems or new symptoms, get medical treatment right away. Follow-up care is a key part of your treatment and safety. Be sure to make and go to all appointments, and call your doctor if you are having problems. It's also a good idea to know your test results and keep a list of the medicines you take. How can you care for yourself at home? · To prevent dehydration, drink plenty of fluids, enough so that your urine is light yellow or clear like water. Choose water and other caffeine-free clear liquids until you feel better. If you have kidney, heart, or liver disease and have to limit fluids, talk with your doctor before you increase the amount of fluids you drink. · Take an over-the-counter pain medicine, such as acetaminophen (Tylenol), ibuprofen (Advil, Motrin), or naproxen (Aleve). Read and follow all instructions on the label. · Before you use cough and cold medicines, check the label. These medicines may not be safe for young children or for people with certain health problems. · Be careful when taking over-the-counter cold or flu medicines and Tylenol at the same time. Many of these medicines have acetaminophen, which is Tylenol. Read the labels to make sure that you are not taking more than the recommended dose. Too much acetaminophen (Tylenol) can be harmful. · Get plenty of rest. 
· Do not smoke or allow others to smoke around you. If you need help quitting, talk to your doctor about stop-smoking programs and medicines. These can increase your chances of quitting for good. When should you call for help? Call 911 anytime you think you may need emergency care. For example, call if: 
? · You have severe trouble breathing. ?Call your doctor now or seek immediate medical care if: 
? · You seem to be getting much sicker. ? · You have new or worse trouble breathing. ? · You have a new or higher fever. ? · You have a new rash. ? Watch closely for changes in your health, and be sure to contact your doctor if: 
? · You have a new symptom, such as a sore throat, an earache, or sinus pain. ? · You cough more deeply or more often, especially if you notice more mucus or a change in the color of your mucus. ? · You do not get better as expected. Where can you learn more? Go to http://yue-sanjeev.info/. Enter A346 in the search box to learn more about \"Upper Respiratory Infection (Cold): Care Instructions. \" Current as of: May 12, 2017 Content Version: 11.4 © 5475-3083 Healthwise, Incorporated. Care instructions adapted under license by [employer] (which disclaims liability or warranty for this information). If you have questions about a medical condition or this instruction, always ask your healthcare professional. [address] any warranty or liability for your use of this information. Introducing [facility]! Obed Neal introduces [employer] patient portal. Now you can access parts of your medical record, email your doctor's office, and request medication refills online. 1. In your internet browser, go to [email or web address] 2. Click on the First Time User? Click Here link in the Sign In box. You will see the New Member Sign Up page. 3. Enter your [employer] Access Code exactly as it appears below. You will not need to use this code after youve completed the sign-up process. If you do not sign up before the expiration date, you must request a new code. · [employer] Access Code: [email or web address] Expires: 4/24/2018  9:38 AM 
 
4. Enter the last four digits of your Social Security Number (xxxx) and Date of Birth (mm/dd/yyyy) as indicated and click Submit. You will be taken to the next sign-up page. 5. Create a [employer] ID. This will be your [employer] login ID and cannot be changed, so think of one that is secure and easy to remember. 6. Create a [employer] password. You can change your password at any time. 7. Enter your Password Reset Question and Answer. This can be used at a later time if you forget your password. 8. Enter your e-mail address. You will receive e-mail notification when new information is available in [address] 9. Click Sign Up. You can now view and download portions of your medical record. 10. Click the Download Summary menu link to download a portable copy of your medical information. If you have questions, please visit the Frequently Asked Questions section of the [employer] website. Remember, [employer] is NOT to be used for urgent needs. For medical emergencies, dial 911. Now available from your iPhone and Android! Please provide this summary of care documentation to your next provider. Your primary care clinician is listed as ANUJ Laboy. If you have any questions after today's visit, please call [phone].

## 2018-01-24 NOTE — PROGRESS NOTES
Subjective:  Mr. Alberto Gosselin is a pleasant 32year old gentleman, patient of Dr. Ajith Santana, who comes in today for evaluation of a 24-36 hour history of a nonproductive cough. It is giving him a terrible bad taste in his mouth. He denies any shortness of breath, wheezing, or hemoptysis. He's had chills overnight. He has had some mild nasal congestion and postnasal drainage. He denies any nausea or vomiting. Last fall he had an anaphylactic reaction to Bactrim and ended up in the ER. Last October he was treated with a course of Doxycycline and did well. Past Medical History:   Diagnosis Date    ADHD (attention deficit hyperactivity disorder)     Allergic rhinitis 10/16/2017    Bipolar disorder ([facility] [address] 10/16/2017    Insomnia 10/16/2017    Otitis externa of right ear 10/16/2017    Tourette's 10/16/2017     Past Surgical History:   Procedure Laterality Date    HX HEENT      HX TONSIL AND ADENOIDECTOMY         Current Outpatient Prescriptions on File Prior to Visit   Medication Sig Dispense Refill    guanFACINE ER (INTUNIV) 4 mg Tb24 ER tablet Take 1 Tab by mouth daily.  pimozide (ORAP) 1 mg Tab Take 4 mg by mouth.  guanfacine (TENEX) 2 mg Tab Take 2 mg by mouth.  LORazepam (ATIVAN) 0.5 mg tablet Take 0.5 mg by mouth.  benztropine (COGENTIN) 0.5 mg tablet Take 0.5 mg by mouth two (2) times a day.  methylPREDNISolone (MEDROL, ERASMO,) 4 mg tablet Take as directed 1 Dose Pack 0     No current facility-administered medications on file prior to visit. Allergies   Allergen Reactions    Bactrim [Sulfamethoxazole-Trimethoprim] Anaphylaxis     Patient given Bactrim DS this morning for sinus infection. Sister called this afternoon patient is in ER with severe reaction; Dr. Ajith Santana notified    Sulfa (Sulfonamide Antibiotics) Anaphylaxis    Lorabid [Loracarbef] Hives    Cefdinir Unknown (comments)     Physical Examination:  GENERAL:  Pleasant young gentleman in no acute distress.   He is alert and oriented. VITALS:  BP: 136/85. P: 100. T: 98.2. O2 sat: 98%. HEENT:  Normocephalic, atraumatic. Left TM normal, right TM very mildly erythematous. Mouth mucosa pink. Tongue midline. Pharynx minimally injected without presence of exudate. No sinus tenderness. NECK:  Supple with bilateral slightly tender posterior cervical adenopathy. CHEST:  Lungs clear to auscultation, no rales or wheezes. CV:  Heart regular rhythm without murmur. EXTREMITIES:  No edema or calf tenderness. Distal pulses were present. Impression:  1. URI. Plan:  1. It was opted to start him on Doxycycline 100 mg twice daily for seven days. 2. He may continue Mucinex as needed since it was helpful. 3. He is to increase fluids and rest.  4. He will call us should he fail to improve or if his condition worsens.

## 2018-01-24 NOTE — PROGRESS NOTES
Jessica Garcia presents with   Chief Complaint   Patient presents with    Cough    Cold Symptoms   Patient of Dr Vernon Delong here with complaint of cold symptoms & cough since yesterday. 1. Have you been to the ER, urgent care clinic since your last visit? Hospitalized since your last visit? No    2. Have you seen or consulted any other health care providers outside of the [facility] since your last visit? Include any pap smears or colon screening.  No

## 2018-01-24 NOTE — PATIENT INSTRUCTIONS
Upper Respiratory Infection (Cold): Care Instructions  Your Care Instructions    An upper respiratory infection, or URI, is an infection of the nose, sinuses, or throat. URIs are spread by coughs, sneezes, and direct contact. The common cold is the most frequent kind of URI. The flu and sinus infections are other kinds of URIs. Almost all URIs are caused by viruses. Antibiotics won't cure them. But you can treat most infections with home care. This may include drinking lots of fluids and taking over-the-counter pain medicine. You will probably feel better in 4 to 10 days. The doctor has checked you carefully, but problems can develop later. If you notice any problems or new symptoms, get medical treatment right away. Follow-up care is a key part of your treatment and safety. Be sure to make and go to all appointments, and call your doctor if you are having problems. It's also a good idea to know your test results and keep a list of the medicines you take. How can you care for yourself at home? · To prevent dehydration, drink plenty of fluids, enough so that your urine is light yellow or clear like water. Choose water and other caffeine-free clear liquids until you feel better. If you have kidney, heart, or liver disease and have to limit fluids, talk with your doctor before you increase the amount of fluids you drink. · Take an over-the-counter pain medicine, such as acetaminophen (Tylenol), ibuprofen (Advil, Motrin), or naproxen (Aleve). Read and follow all instructions on the label. · Before you use cough and cold medicines, check the label. These medicines may not be safe for young children or for people with certain health problems. · Be careful when taking over-the-counter cold or flu medicines and Tylenol at the same time. Many of these medicines have acetaminophen, which is Tylenol. Read the labels to make sure that you are not taking more than the recommended dose.  Too much acetaminophen (Tylenol) can be harmful. · Get plenty of rest.  · Do not smoke or allow others to smoke around you. If you need help quitting, talk to your doctor about stop-smoking programs and medicines. These can increase your chances of quitting for good. When should you call for help? Call 911 anytime you think you may need emergency care. For example, call if:  ? · You have severe trouble breathing. ?Call your doctor now or seek immediate medical care if:  ? · You seem to be getting much sicker. ? · You have new or worse trouble breathing. ? · You have a new or higher fever. ? · You have a new rash. ? Watch closely for changes in your health, and be sure to contact your doctor if:  ? · You have a new symptom, such as a sore throat, an earache, or sinus pain. ? · You cough more deeply or more often, especially if you notice more mucus or a change in the color of your mucus. ? · You do not get better as expected. Where can you learn more? Go to http://yue-sanjeev.info/. Enter W083 in the search box to learn more about \"Upper Respiratory Infection (Cold): Care Instructions. \"  Current as of: May 12, 2017  Content Version: 11.4  © 8580-8369 Healthwise, Incorporated. Care instructions adapted under license by [employer] (which disclaims liability or warranty for this information). If you have questions about a medical condition or this instruction, always ask your healthcare professional. [place] [address] any warranty or liability for your use of this information.

## 2018-01-24 NOTE — LETTER
NOTIFICATION RETURN TO WORK / SCHOOL 
 
1/24/2018 9:33 AM 
 
Mr. Laura Hooper 
[address] [facility] [address] [address] To Whom It May Concern: 
 
Laura Hooper is currently under the care of Hansa Lamb. He will return to work/school on: Friday, January 26, 2018. If there are questions or concerns please have the patient contact our office.  
 
 
 
Sincerely, 
 
 
Alejandro Saucedo NP

## 2018-01-31 ENCOUNTER — OFFICE VISIT (OUTPATIENT)
Dept: INTERNAL MEDICINE CLINIC | Age: 28
End: 2018-01-31

## 2018-01-31 VITALS
BODY MASS INDEX: 32.18 KG/M2 | WEIGHT: 205 LBS | OXYGEN SATURATION: 98 % | HEIGHT: 67 IN | DIASTOLIC BLOOD PRESSURE: 84 MMHG | SYSTOLIC BLOOD PRESSURE: 126 MMHG | HEART RATE: 75 BPM | TEMPERATURE: 99.1 F

## 2018-01-31 DIAGNOSIS — R05.9 COUGH: ICD-10-CM

## 2018-01-31 DIAGNOSIS — R68.89 FLU-LIKE SYMPTOMS: Primary | ICD-10-CM

## 2018-01-31 LAB
FLUAV+FLUBV AG NOSE QL IA.RAPID: NEGATIVE POS/NEG
FLUAV+FLUBV AG NOSE QL IA.RAPID: NEGATIVE POS/NEG
VALID INTERNAL CONTROL?: YES

## 2018-01-31 RX ORDER — METHYLPREDNISOLONE 4 MG/1
4 TABLET ORAL
Qty: 1 DOSE PACK | Refills: 0 | Status: SHIPPED | OUTPATIENT
Start: 2018-01-31 | End: 2018-04-03 | Stop reason: ALTCHOICE

## 2018-01-31 NOTE — PROGRESS NOTES
Subjective:  Mr. Sheron Chaparro is a pleasant 32year old gentleman, patient of Dr. Slade Tipton, who comes in today for evaluation of persistent cough and sore throat. I saw him a week ago, at which time I treated him for a URI with a course of Doxycycline. Although he's gotten better, he is now concerned because his throat is hurting this morning that he could have the flu and since he has a remaining cough that he could have pneumonia. He's read on the news that people were dying from these conditions. His cough is now producing sputum. He did have a little wheezing yesterday, but denies any shortness of breath or hemoptysis. He denies any chills or fever. Denies any nausea or vomiting. He's been able to work at [employer] throughout this illness. Past Medical History:   Diagnosis Date    ADHD (attention deficit hyperactivity disorder)     Allergic rhinitis 10/16/2017    Bipolar disorder ([address] 10/16/2017    Insomnia 10/16/2017    Otitis externa of right ear 10/16/2017    Tourette's 10/16/2017     Past Surgical History:   Procedure Laterality Date    HX HEENT      HX TONSIL AND ADENOIDECTOMY         Current Outpatient Prescriptions on File Prior to Visit   Medication Sig Dispense Refill    doxycycline (ADOXA) 100 mg tablet Take 1 Tab by mouth two (2) times a day. 14 Tab 0    guanFACINE ER (INTUNIV) 4 mg Tb24 ER tablet Take 1 Tab by mouth daily.  pimozide (ORAP) 1 mg Tab Take 4 mg by mouth.  guanfacine (TENEX) 2 mg Tab Take 2 mg by mouth.  LORazepam (ATIVAN) 0.5 mg tablet Take 0.5 mg by mouth.  benztropine (COGENTIN) 0.5 mg tablet Take 0.5 mg by mouth two (2) times a day.  methylPREDNISolone (MEDROL, ERASMO,) 4 mg tablet Take as directed 1 Dose Pack 0     No current facility-administered medications on file prior to visit. Allergies   Allergen Reactions    Bactrim [Sulfamethoxazole-Trimethoprim] Anaphylaxis     Patient given Bactrim DS this morning for sinus infection.   Sister called this afternoon patient is in ER with severe reaction; Dr. Mariano Wilkerson notified    Sulfa (Sulfonamide Antibiotics) Anaphylaxis    Lorabid [Loracarbef] Hives    Cefdinir Unknown (comments)     Physical Examination:  GENERAL:  Pleasant young gentleman in no acute distress. He is alert and oriented. VITALS:  BP: 126/84. P: 75.  T: 99.1. O2 sat: 98%. HEENT:  Normocephalic, atraumatic. TMs normal. Mouth mucosa pink. Tongue midline. Pharynx minimally injected without presence of exudates. No sinus tenderness. NECK:  Supple without adenopathy. CHEST:  Lungs clear except for occasional wheeze, no rales. CV:  Heart regular rhythm without murmur. EXTREMITIES:  No edema or calf tenderness. Studies:  Influenza was negative for both A and B. Two views of the chest failed to reveal any pneumonia. Impression:  1. URI, slowly improving. Plan:  1. I have asked him to finish the course of Doxycycline and I have added a Medrol Dosepak. 2. He is to increase fluids and rest.  3. He is reassured there is no evidence of pneumonia or flu at this time. 4. He'll contact us if he fails to improve or if condition worsens.

## 2018-01-31 NOTE — MR AVS SNAPSHOT
[address] [facility] 70 [address] [address] [phone] Patient: Froylan Camilo MRN: [id number] :1990 Visit Information Date & Time Provider Department Dept. Phone Encounter #  
 2018  9:45 AM Windy Carr NP 20 [facility] [phone] [id number] Follow-up Instructions Return if symptoms worsen or fail to improve. Upcoming Health Maintenance Date Due DTaP/Tdap/Td series (1 - Tdap) 2009 Influenza Age 5 to Adult 2017 Allergies as of 2018  Review Complete On: 2018 By: Windy Carr NP Severity Noted Reaction Type Reactions Bactrim [Sulfamethoxazole-trimethoprim] High 10/16/2017    Anaphylaxis Patient given Bactrim DS this morning for sinus infection. Sister called this afternoon patient is in ER with severe reaction; Dr. Anita Odonnell notified Sulfa (Sulfonamide Antibiotics) High 10/30/2017   Not Verified Anaphylaxis Lorabid [Loracarbef]  2012    Hives Cefdinir Low 10/16/2017    Unknown (comments) Current Immunizations  Never Reviewed No immunizations on file. Not reviewed this visit You Were Diagnosed With   
  
 Codes Comments Flu-like symptoms    -  Primary ICD-10-CM: R68.89 ICD-9-CM: 780.99 Cough     ICD-10-CM: R05 ICD-9-CM: 410. 2 Vitals BP Pulse Temp Height(growth percentile) Weight(growth percentile) SpO2  
 126/84 (BP 1 Location: Left arm, BP Patient Position: Sitting) 75 99.1 °F (37.3 °C) (Oral) 5' 7\" (1.702 m) 205 lb (93 kg) 98% BMI Smoking Status 32.11 kg/m2 Never Smoker BMI and BSA Data Body Mass Index Body Surface Area  
 32.11 kg/m 2 2.1 m 2 Preferred Pharmacy Pharmacy Name Phone [facility]ze [address], [address] [place] [address] Floresita Rooney [phone] Your Updated Medication List  
  
   
 This list is accurate as of: 1/31/18 12:44 PM.  Always use your most recent med list.  
  
  
  
  
 benztropine 0.5 mg tablet Commonly known as:  COGENTIN Take 0.5 mg by mouth two (2) times a day. doxycycline 100 mg tablet Commonly known as:  ADOXA Take 1 Tab by mouth two (2) times a day. LORazepam 0.5 mg tablet Commonly known as:  ATIVAN Take 0.5 mg by mouth. * methylPREDNISolone 4 mg tablet Commonly known as:  MEDROL (ERASMO) Take as directed * methylPREDNISolone 4 mg tablet Commonly known as:  Prabha Bent Take 1 Tab by mouth Specific Days and Specific Times. ORAP 1 mg tablet Generic drug:  pimozide Take 4 mg by mouth. * guanFACINE ER 4 mg Tb24 ER tablet Commonly known as:  INTUNIV Take 1 Tab by mouth daily. * TENEX 2 mg IR tablet Generic drug:  guanFACINE IR Take 2 mg by mouth. * Notice: This list has 4 medication(s) that are the same as other medications prescribed for you. Read the directions carefully, and ask your doctor or other care provider to review them with you. Prescriptions Sent to Pharmacy Refills  
 methylPREDNISolone (MEDROL DOSEPACK) 4 mg tablet 0 Sig: Take 1 Tab by mouth Specific Days and Specific Times. Class: Normal  
 Pharmacy: [address] Adam Staples Ph #: [phone] Route: Oral  
  
We Performed the Following AMB POC LAUREN INFLUENZA A/B TEST [53990 CPT(R)] Follow-up Instructions Return if symptoms worsen or fail to improve. To-Do List   
 01/31/2018 Imaging:  XR CHEST PA LAT Patient Instructions Cough: Care Instructions Your Care Instructions A cough is your body's response to something that bothers your throat or airways. Many things can cause a cough. You might cough because of a cold or the flu, bronchitis, or asthma.  Smoking, postnasal drip, allergies, and stomach acid that backs up into your throat also can cause coughs. A cough is a symptom, not a disease. Most coughs stop when the cause, such as a cold, goes away. You can take a few steps at home to cough less and feel better. Follow-up care is a key part of your treatment and safety. Be sure to make and go to all appointments, and call your doctor if you are having problems. It's also a good idea to know your test results and keep a list of the medicines you take. How can you care for yourself at home? · Drink lots of water and other fluids. This helps thin the mucus and soothes a dry or sore throat. Honey or lemon juice in hot water or tea may ease a dry cough. · Take cough medicine as directed by your doctor. · Prop up your head on pillows to help you breathe and ease a dry cough. · Try cough drops to soothe a dry or sore throat. Cough drops don't stop a cough. Medicine-flavored cough drops are no better than candy-flavored drops or hard candy. · Do not smoke. Avoid secondhand smoke. If you need help quitting, talk to your doctor about stop-smoking programs and medicines. These can increase your chances of quitting for good. When should you call for help? Call 911 anytime you think you may need emergency care. For example, call if: 
? · You have severe trouble breathing. ?Call your doctor now or seek immediate medical care if: 
? · You cough up blood. ? · You have new or worse trouble breathing. ? · You have a new or higher fever. ? · You have a new rash. ? Watch closely for changes in your health, and be sure to contact your doctor if: 
? · You cough more deeply or more often, especially if you notice more mucus or a change in the color of your mucus. ? · You have new symptoms, such as a sore throat, an earache, or sinus pain. ? · You do not get better as expected. Where can you learn more? Go to http://yue-sanjeev.info/. Enter D279 in the search box to learn more about \"Cough: Care Instructions. \" Current as of: May 12, 2017 Content Version: 11.4 © 3621-0025 Healthwise, [place]. Care instructions adapted under license by [employer] (which disclaims liability or warranty for this information). If you have questions about a medical condition or this instruction, always ask your healthcare professional. [address] any warranty or liability for your use of this information. Introducing [facility]! Michelle Laws introduces [employer] patient portal. Now you can access parts of your medical record, email your doctor's office, and request medication refills online. 1. In your internet browser, go to [email or web address] 2. Click on the First Time User? Click Here link in the Sign In box. You will see the New Member Sign Up page. 3. Enter your [employer] Access Code exactly as it appears below. You will not need to use this code after youve completed the sign-up process. If you do not sign up before the expiration date, you must request a new code. · [employer] Access Code: [email or web address] Expires: 4/24/2018  9:38 AM 
 
4. Enter the last four digits of your Social Security Number (xxxx) and Date of Birth (mm/dd/yyyy) as indicated and click Submit. You will be taken to the next sign-up page. 5. Create a [employer] ID. This will be your [employer] login ID and cannot be changed, so think of one that is secure and easy to remember. 6. Create a [employer] password. You can change your password at any time. 7. Enter your Password Reset Question and Answer. This can be used at a later time if you forget your password. 8. Enter your e-mail address. You will receive e-mail notification when new information is available in [address] 9. Click Sign Up. You can now view and download portions of your medical record. 10. Click the Download Summary menu link to download a portable copy of your medical information. If you have questions, please visit the Frequently Asked Questions section of the [employer] website. Remember, [employer] is NOT to be used for urgent needs. For medical emergencies, dial 911. Now available from your iPhone and Android! Please provide this summary of care documentation to your next provider. Your primary care clinician is listed as ANUJ Hebert. If you have any questions after today's visit, please call [phone].

## 2018-01-31 NOTE — PROGRESS NOTES
Susan Jo presents with   Chief Complaint   Patient presents with    Nasal Congestion    Shortness of Breath    Ear Fullness     left    Fever   Patient of Dr Cornelius Nolen who returns with complaint of unresolved cough, head & chest congestion, left ear fullness, & fever. Patient states that fever last week was 103.6 & was elevated x4 days. 1. Have you been to the ER, urgent care clinic since your last visit? Hospitalized since your last visit? No    2. Have you seen or consulted any other health care providers outside of the [address] [place] [address] since your last visit? Include any pap smears or colon screening.  No

## 2018-01-31 NOTE — PATIENT INSTRUCTIONS

## 2018-02-07 ENCOUNTER — OFFICE VISIT (OUTPATIENT)
Dept: INTERNAL MEDICINE CLINIC | Age: 28
End: 2018-02-07

## 2018-02-07 VITALS
WEIGHT: 205.2 LBS | HEART RATE: 87 BPM | HEIGHT: 67 IN | RESPIRATION RATE: 14 BRPM | TEMPERATURE: 98.5 F | OXYGEN SATURATION: 97 % | DIASTOLIC BLOOD PRESSURE: 94 MMHG | SYSTOLIC BLOOD PRESSURE: 122 MMHG | BODY MASS INDEX: 32.21 KG/M2

## 2018-02-07 DIAGNOSIS — J32.0 CHRONIC MAXILLARY SINUSITIS: Primary | ICD-10-CM

## 2018-02-07 RX ORDER — CLINDAMYCIN HYDROCHLORIDE 300 MG/1
300 CAPSULE ORAL 3 TIMES DAILY
Qty: 30 CAP | Refills: 0 | Status: SHIPPED | OUTPATIENT
Start: 2018-02-07 | End: 2018-09-25

## 2018-02-07 NOTE — MR AVS SNAPSHOT
303 [facility] 70 [address] [address] [phone] Patient: Debby Whitney MRN: [id number] :1990 Visit Information Date & Time Provider Department Dept. Phone Encounter #  
 2018 10:30 AM ANUJ Marshall MD [facility] [phone] [id number] Follow-up Instructions Return if symptoms worsen or fail to improve. Upcoming Health Maintenance Date Due DTaP/Tdap/Td series (1 - Tdap) 2009 Influenza Age 5 to Adult 2017 Allergies as of 2018  Review Complete On: 2018 By: Mana Brooks MD  
  
 Severity Noted Reaction Type Reactions Bactrim [Sulfamethoxazole-trimethoprim] High 10/16/2017    Anaphylaxis Patient given Bactrim DS this morning for sinus infection. Sister called this afternoon patient is in ER with severe reaction; Dr. Xiao Gray notified Sulfa (Sulfonamide Antibiotics) High 10/30/2017   Not Verified Anaphylaxis Lorabid [Loracarbef]  2012    Hives Cefdinir Low 10/16/2017    Unknown (comments) Current Immunizations  Never Reviewed No immunizations on file. Not reviewed this visit You Were Diagnosed With   
  
 Codes Comments Chronic maxillary sinusitis    -  Primary ICD-10-CM: J32.0 ICD-9-CM: 473.0 Vitals BP Pulse Temp Resp Height(growth percentile) Weight(growth percentile) (!) 122/94 (BP 1 Location: Right arm, BP Patient Position: Sitting) 87 98.5 °F (36.9 °C) (Oral) 14 5' 7\" (1.702 m) 205 lb 3.2 oz (93.1 kg) SpO2 BMI Smoking Status 97% 32.14 kg/m2 Never Smoker Vitals History BMI and BSA Data Body Mass Index Body Surface Area  
 32.14 kg/m 2 2.1 m 2 Preferred Pharmacy Pharmacy Name Phone Anais Kulwinder [address], [address] [place] [address] [phone] Your Updated Medication List  
  
   
 This list is accurate as of: 2/7/18 11:44 AM.  Always use your most recent med list.  
  
  
  
  
 benztropine 0.5 mg tablet Commonly known as:  COGENTIN Take 0.5 mg by mouth two (2) times a day. clindamycin 300 mg capsule Commonly known as:  CLEOCIN Take 1 Cap by mouth three (3) times daily. doxycycline 100 mg tablet Commonly known as:  ADOXA Take 1 Tab by mouth two (2) times a day. LORazepam 0.5 mg tablet Commonly known as:  ATIVAN Take 0.5 mg by mouth. * methylPREDNISolone 4 mg tablet Commonly known as:  MEDROL (ERASMO) Take as directed * methylPREDNISolone 4 mg tablet Commonly known as:  [place] Take 1 Tab by mouth Specific Days and Specific Times. ORAP 1 mg tablet Generic drug:  pimozide Take 4 mg by mouth. * guanFACINE ER 4 mg Tb24 ER tablet Commonly known as:  INTUNIV Take 1 Tab by mouth daily. * TENEX 2 mg IR tablet Generic drug:  guanFACINE IR Take 2 mg by mouth. * Notice: This list has 4 medication(s) that are the same as other medications prescribed for you. Read the directions carefully, and ask your doctor or other care provider to review them with you. Prescriptions Sent to Pharmacy Refills  
 clindamycin (CLEOCIN) 300 mg capsule 0 Sig: Take 1 Cap by mouth three (3) times daily. Class: Normal  
 Pharmacy: Sloan Wilson [address] [place] [address] Didicaionavid Chahal Ph #: [phone] Route: Oral  
  
Follow-up Instructions Return if symptoms worsen or fail to improve. Introducing [facility]! [address] [facility] introduces [employer] patient portal. Now you can access parts of your medical record, email your doctor's office, and request medication refills online. 1. In your internet browser, go to [email or web address] 2. Click on the First Time User? Click Here link in the Sign In box. You will see the New Member Sign Up page. 3. Enter your [employer] Access Code exactly as it appears below. You will not need to use this code after youve completed the sign-up process. If you do not sign up before the expiration date, you must request a new code. · [employer] Access Code: [email or web address] Expires: 4/24/2018  9:38 AM 
 
4. Enter the last four digits of your Social Security Number (xxxx) and Date of Birth (mm/dd/yyyy) as indicated and click Submit. You will be taken to the next sign-up page. 5. Create a [employer] ID. This will be your [employer] login ID and cannot be changed, so think of one that is secure and easy to remember. 6. Create a [employer] password. You can change your password at any time. 7. Enter your Password Reset Question and Answer. This can be used at a later time if you forget your password. 8. Enter your e-mail address. You will receive e-mail notification when new information is available in [address] 9. Click Sign Up. You can now view and download portions of your medical record. 10. Click the Download Summary menu link to download a portable copy of your medical information. If you have questions, please visit the Frequently Asked Questions section of the [employer] website. Remember, [employer] is NOT to be used for urgent needs. For medical emergencies, dial 911. Now available from your iPhone and Android! Please provide this summary of care documentation to your next provider. Your primary care clinician is listed as ANUJ Horan. If you have any questions after today's visit, please call [phone].

## 2018-02-07 NOTE — PROGRESS NOTES
This note will not be viewable in [address] Miri Diaz is a 32 y.o. male and presents with Cold Symptoms (Cough, Nasal Congestion, Ear Pressure. X 2 weeks.  )  . Subjective:  Dianne Darling presents today with complaint of sinus congestion, ear pressure, drainage, and cough which started about 2 weeks ago. He was initially seen and tested negative for influenza. He was placed on a course of doxycycline. His symptoms do not improve and he was placed on a Medrol Dosepak. He had a follow-up chest x-ray which was negative. Despite this his symptoms have persisted. The fever that he had initially has subsided. He now has sinus pressure congestion and drainage that persists. He is allergic to sulfa and cephalosporins. Past Medical History:   Diagnosis Date    ADHD (attention deficit hyperactivity disorder)     Allergic rhinitis 10/16/2017    Bipolar disorder ([address] 10/16/2017    Insomnia 10/16/2017    Otitis externa of right ear 10/16/2017    Tourette's 10/16/2017     Past Surgical History:   Procedure Laterality Date    HX HEENT      HX TONSIL AND ADENOIDECTOMY       Allergies   Allergen Reactions    Bactrim [Sulfamethoxazole-Trimethoprim] Anaphylaxis     Patient given Bactrim DS this morning for sinus infection. Sister called this afternoon patient is in ER with severe reaction; Dr. Asif Owens notified    Sulfa (Sulfonamide Antibiotics) Anaphylaxis    Lorabid [Loracarbef] Hives    Cefdinir Unknown (comments)     Current Outpatient Prescriptions   Medication Sig Dispense Refill    clindamycin (CLEOCIN) 300 mg capsule Take 1 Cap by mouth three (3) times daily. 30 Cap 0    methylPREDNISolone (MEDROL DOSEPACK) 4 mg tablet Take 1 Tab by mouth Specific Days and Specific Times. 1 Dose Pack 0    methylPREDNISolone (MEDROL, ERASMO,) 4 mg tablet Take as directed 1 Dose Pack 0    guanFACINE ER (INTUNIV) 4 mg Tb24 ER tablet Take 1 Tab by mouth daily.  pimozide (ORAP) 1 mg Tab Take 4 mg by mouth.  guanfacine (TENEX) 2 mg Tab Take 2 mg by mouth.  LORazepam (ATIVAN) 0.5 mg tablet Take 0.5 mg by mouth.  benztropine (COGENTIN) 0.5 mg tablet Take 0.5 mg by mouth two (2) times a day. Social History     Social History    Marital status: SINGLE     Spouse name: N/A    Number of children: N/A    Years of education: N/A     Social History Main Topics    Smoking status: Never Smoker    Smokeless tobacco: Never Used    Alcohol use Yes    Drug use: No    Sexual activity: Not Asked     Other Topics Concern    None     Social History Narrative     Family History   Problem Relation Age of Onset    Heart Disease Mother     No Known Problems Father        Review of Systems  Constitutional: negative for fevers, chills, anorexia and weight loss  Eyes:   negative for visual disturbance and irritation  ENT:   Positive for some sinus congestion and post nasal drainage. Respiratory:  Positive for cough and chest congestion without wheezing  CV:   negative for chest pain, palpitations, lower extremity edema  GI:   negative for nausea, vomiting, diarrhea, abdominal pain,melena  Integumentary: negative for rash and pruritus  Neurological:  negative for headaches, dizziness, vertigo, memory problems and gait       Objective:  Visit Vitals    BP (!) 122/94 (BP 1 Location: Right arm, BP Patient Position: Sitting)    Pulse 87    Temp 98.5 °F (36.9 °C) (Oral)    Resp 14    Ht 5' 7\" (1.702 m)    Wt 205 lb 3.2 oz (93.1 kg)    SpO2 97%    BMI 32.14 kg/m2     Physical Exam:   General appearance - alert, ill appearing, and in no distress  Mental status - alert, oriented to person, place, and time  EYE-HENRY, EOMI, conjuctiva clear.  No lid swelling or purulent drainage  ENT-opacified bilaterally tympanic membranes, injected on the left, erythematous on the right, pharynx slightly erythematous with drainage noted  Nose -edematous and boggy  Neck - supple, no significant adenopathy   Chest - Coarse upper airway rhonchi present without wheezing   Heart - normal rate, regular rhythm, normal S1, S2, no murmurs, rubs, clicks or gallops   Skin-No rash appreciated  Neuro -alert, oriented, normal speech, no focal findings. Assessment/Plan:  Diagnoses and all orders for this visit:    1. Chronic maxillary sinusitis    Other orders  -     clindamycin (CLEOCIN) 300 mg capsule; Take 1 Cap by mouth three (3) times daily. Plan:    I suspect the patient has a chronic or subacute sinusitis after having an initial viral infection. He did not respond to doxycycline therefore I am changing him to clindamycin for 10 days. He will follow-up if his symptoms have not resolved. Other Instructions:  Mucinex as directed    Increase po fluids    Follow-up Disposition:  Return if symptoms worsen or fail to improve. I have reviewed with the patient details of the assessment and plan and all questions were answered. Relevent patient education was performed. An After Visit Summary was printed and given to the patient.     Jim Rowe MD

## 2018-02-07 NOTE — PROGRESS NOTES
Karno Hand is a 32 y.o. male      Chief Complaint   Patient presents with    Cold Symptoms     Cough, Nasal Congestion, Ear Pressure. X 2 weeks. 1. Have you been to the ER, urgent care clinic since your last visit? Hospitalized since your last visit? No    2. Have you seen or consulted any other health care providers outside of the [place] [address] since your last visit? Include any pap smears or colon screening.  No

## 2018-02-22 ENCOUNTER — CLINICAL SUPPORT (OUTPATIENT)
Dept: INTERNAL MEDICINE CLINIC | Age: 28
End: 2018-02-22

## 2018-02-22 DIAGNOSIS — Z23 ENCOUNTER FOR IMMUNIZATION: Primary | ICD-10-CM

## 2018-04-03 ENCOUNTER — OFFICE VISIT (OUTPATIENT)
Dept: INTERNAL MEDICINE CLINIC | Age: 28
End: 2018-04-03

## 2018-04-03 VITALS
DIASTOLIC BLOOD PRESSURE: 80 MMHG | HEIGHT: 67 IN | HEART RATE: 95 BPM | SYSTOLIC BLOOD PRESSURE: 148 MMHG | OXYGEN SATURATION: 98 % | BODY MASS INDEX: 30.76 KG/M2 | WEIGHT: 196 LBS

## 2018-04-03 DIAGNOSIS — M79.672 LEFT FOOT PAIN: Primary | ICD-10-CM

## 2018-04-03 NOTE — PROGRESS NOTES
This note will not be viewable in [address] Subjective:     Evita Kevin presents to the office today with left foot pain. 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 He was on his knees at the time and tried to push himself up to stand up. He felt a cracking sensation in his left foot. There is been some mild pain with weightbearing since that time. The pain is localized in the dorsal aspect of the foot. He states that he has a history of a prior fracture in this foot. He denies any radicular discomfort and is had no swelling, redness or ecchymosis of the foot develop. Past Medical History:   Diagnosis Date    ADHD (attention deficit hyperactivity disorder)     Allergic rhinitis 10/16/2017    Bipolar disorder ([address] 10/16/2017    Insomnia 10/16/2017    Otitis externa of right ear 10/16/2017    Tourette's 10/16/2017     Past Surgical History:   Procedure Laterality Date    HX HEENT      HX TONSIL AND ADENOIDECTOMY       Allergies   Allergen Reactions    Bactrim [Sulfamethoxazole-Trimethoprim] Anaphylaxis     Patient given Bactrim DS this morning for sinus infection. Sister called this afternoon patient is in ER with severe reaction; Dr. Saranya Wilson notified    Sulfa (Sulfonamide Antibiotics) Anaphylaxis    Lorabid [Loracarbef] Hives    Cefdinir Unknown (comments)     Current Outpatient Prescriptions   Medication Sig Dispense Refill    clindamycin (CLEOCIN) 300 mg capsule Take 1 Cap by mouth three (3) times daily. 30 Cap 0    guanFACINE ER (INTUNIV) 4 mg Tb24 ER tablet Take 1 Tab by mouth daily.  pimozide (ORAP) 1 mg Tab Take 4 mg by mouth.  guanfacine (TENEX) 2 mg Tab Take 2 mg by mouth.  LORazepam (ATIVAN) 0.5 mg tablet Take 0.5 mg by mouth.  benztropine (COGENTIN) 0.5 mg tablet Take 0.5 mg by mouth two (2) times a day.          Social History     Social History    Marital status: SINGLE     Spouse name: N/A    Number of children: N/A    Years of education: N/A     Social History Main Topics    Smoking status: Never Smoker    Smokeless tobacco: Never Used    Alcohol use Yes    Drug use: No    Sexual activity: Not Asked     Other Topics Concern    None     Social History Narrative     Family History   Problem Relation Age of Onset    Heart Disease Mother     No Known Problems Father        Review of Systems:  GEN: no weight loss, weight gain, fatigue or night sweats  EXT: denies edema, claudication. Complains of foot pain in the dorsal aspect of the foot   neurological ROS: no TIA or stroke symptoms  ROS otherwise negative      Objective:     Visit Vitals    /80 (BP 1 Location: Right arm, BP Patient Position: Sitting)    Pulse 95    Ht 5' 7\" (1.702 m)    Wt 196 lb (88.9 kg)    SpO2 98%    BMI 30.7 kg/m2     Body mass index is 30.7 kg/(m^2). General:   alert, cooperative and no distress   Extremities: No edema or cyanosis. The left foot is not swollen nor is there redness or warmth. He has a normal range of motion of the ankle. There is no pain in the heel or arch of the foot. The patient notes pain with palpation over the dorsal aspect of the proximal metatarsals. His dorsalis pedis and PT pulses are intact. Neuro: ..alert, oriented x3,speech normal in context and clarity, cranial nerves II-XII intact,motor strength: full proximally and distally,gait: normal  reflexes: full and symmetric     Physical exam otherwise negative         Assessment/Plan:     Diagnoses and all orders for this visit:    Left foot pain  -     XR FOOT LT MIN 3 V; Future        Other instructions:   X-rays of the foot were unrevealing for fracture.     Ice packs to be applied 3-4 times a day 5-10 minutes    He does have  naproxen at home that he can use for pain    WBAT    Follow-up if there is worsening    Follow-up Disposition: Not on Michelle Winters MD

## 2018-04-03 NOTE — PATIENT INSTRUCTIONS
Foot Pain: Care Instructions  Your Care Instructions  Foot injuries that cause pain and swelling are fairly common. Almost all sports or home repair projects can cause a misstep that ends up as foot pain. Normal wear and tear, especially as you get older, also can cause foot pain. Most minor foot injuries will heal on their own, and home treatment is usually all you need to do. If you have a severe injury, you may need tests and treatment. Follow-up care is a key part of your treatment and safety. Be sure to make and go to all appointments, and call your doctor if you are having problems. It's also a good idea to know your test results and keep a list of the medicines you take. How can you care for yourself at home? · Take pain medicines exactly as directed. ¨ If the doctor gave you a prescription medicine for pain, take it as prescribed. ¨ If you are not taking a prescription pain medicine, ask your doctor if you can take an over-the-counter medicine. · Rest and protect your foot. Take a break from any activity that may cause pain. · Put ice or a cold pack on your foot for 10 to 20 minutes at a time. Put a thin cloth between the ice and your skin. · Prop up the sore foot on a pillow when you ice it or anytime you sit or lie down during the next 3 days. Try to keep it above the level of your heart. This will help reduce swelling. · Your doctor may recommend that you wrap your foot with an elastic bandage. Keep your foot wrapped for as long as your doctor advises. · If your doctor recommends crutches, use them as directed. · Wear roomy footwear. · As soon as pain and swelling end, begin gentle exercises of your foot. Your doctor can tell you which exercises will help. When should you call for help? Call 911 anytime you think you may need emergency care. For example, call if:  ? · Your foot turns pale, white, blue, or cold.    ?Call your doctor now or seek immediate medical care if:  ? · You cannot move or stand on your foot. ? · Your foot looks twisted or out of its normal position. ? · Your foot is not stable when you step down. ? · You have signs of infection, such as:  ¨ Increased pain, swelling, warmth, or redness. ¨ Red streaks leading from the sore area. ¨ Pus draining from a place on your foot. ¨ A fever. ? · Your foot is numb or tingly. ? Watch closely for changes in your health, and be sure to contact your doctor if:  ? · You do not get better as expected. ? · You have bruises from an injury that last longer than 2 weeks. Where can you learn more? Go to http://yue-sanjeev.info/. Enter B455 in the search box to learn more about \"Foot Pain: Care Instructions. \"  Current as of: March 21, 2017  Content Version: 11.4  © 2831-3708 [employer]. Care instructions adapted under license by [employer] (which disclaims liability or warranty for this information). If you have questions about a medical condition or this instruction, always ask your healthcare professional. [address] any warranty or liability for your use of this information.

## 2018-04-03 NOTE — MR AVS SNAPSHOT
[address] [facility] [address] [place] [address] [phone] Patient: Nicole Ca MRN: [id number] :1990 Visit Information Date & Time Provider Department Dept. Phone Encounter #  
 4/3/2018 10:40 AM Hermila Duenas MD [facility] [id number] Follow-up Instructions Return if symptoms worsen or fail to improve. Upcoming Health Maintenance Date Due DTaP/Tdap/Td series (1 - Tdap) 2009 Allergies as of 4/3/2018  Review Complete On: 4/3/2018 By: Hermila Duenas MD  
  
 Severity Noted Reaction Type Reactions Bactrim [Sulfamethoxazole-trimethoprim] High 10/16/2017    Anaphylaxis Patient given Bactrim DS this morning for sinus infection. Sister called this afternoon patient is in ER with severe reaction; Dr. Teresa Xavier notified Sulfa (Sulfonamide Antibiotics) High 10/30/2017   Not Verified Anaphylaxis Lorabid [Loracarbef]  2012    Hives Cefdinir Low 10/16/2017    Unknown (comments) Current Immunizations  Never Reviewed Name Date Influenza Vaccine (Quad) PF 2018 Not reviewed this visit You Were Diagnosed With   
  
 Codes Comments Left foot pain    -  Primary ICD-10-CM: I77.013 ICD-9-CM: 729.5 Vitals BP Pulse Height(growth percentile) Weight(growth percentile) SpO2 BMI  
 148/80 (BP 1 Location: Right arm, BP Patient Position: Sitting) 95 5' 7\" (1.702 m) 196 lb (88.9 kg) 98% 30.7 kg/m2 Smoking Status Never Smoker BMI and BSA Data Body Mass Index Body Surface Area 30.7 kg/m 2 2.05 m 2 Preferred Pharmacy Pharmacy Name Phone [facility] [address], [address] [place] [address] [phone] Your Updated Medication List  
  
   
This list is accurate as of 4/3/18 11:21 AM.  Always use your most recent med list.  
  
  
  
  
 benztropine 0.5 mg tablet Commonly known as:  COGENTIN Take 0.5 mg by mouth two (2) times a day. clindamycin 300 mg capsule Commonly known as:  CLEOCIN Take 1 Cap by mouth three (3) times daily. LORazepam 0.5 mg tablet Commonly known as:  ATIVAN Take 0.5 mg by mouth. ORAP 1 mg tablet Generic drug:  pimozide Take 4 mg by mouth. * guanFACINE ER 4 mg Tb24 ER tablet Commonly known as:  INTUNIV Take 1 Tab by mouth daily. * TENEX 2 mg IR tablet Generic drug:  guanFACINE IR Take 2 mg by mouth. * Notice: This list has 2 medication(s) that are the same as other medications prescribed for you. Read the directions carefully, and ask your doctor or other care provider to review them with you. Follow-up Instructions Return if symptoms worsen or fail to improve. To-Do List   
 04/03/2018 Imaging:  XR FOOT LT MIN 3 V Introducing [facility]! Eleni Peterson introduces [employer] patient portal. Now you can access parts of your medical record, email your doctor's office, and request medication refills online. 1. In your internet browser, go to [email or web address] 2. Click on the First Time User? Click Here link in the Sign In box. You will see the New Member Sign Up page. 3. Enter your [employer] Access Code exactly as it appears below. You will not need to use this code after youve completed the sign-up process. If you do not sign up before the expiration date, you must request a new code. · [employer] Access Code: [email or web address] Expires: 4/24/2018 10:38 AM 
 
4. Enter the last four digits of your Social Security Number (xxxx) and Date of Birth (mm/dd/yyyy) as indicated and click Submit. You will be taken to the next sign-up page. 5. Create a HItviewst ID. This will be your HIt[employer]st login ID and cannot be changed, so think of one that is secure and easy to remember. 6. Create a Privaris password. You can change your password at any time. 7. Enter your Password Reset Question and Answer. This can be used at a later time if you forget your password. 8. Enter your e-mail address. You will receive e-mail notification when new information is available in [address] 9. Click Sign Up. You can now view and download portions of your medical record. 10. Click the Download Summary menu link to download a portable copy of your medical information. If you have questions, please visit the Frequently Asked Questions section of the [employer] website. Remember, [employer] is NOT to be used for urgent needs. For medical emergencies, dial 911. Now available from your iPhone and Android! Please provide this summary of care documentation to your next provider. Your primary care clinician is listed as ANUJ Gonzales. If you have any questions after today's visit, please call [phone].

## 2018-04-03 NOTE — PROGRESS NOTES
Gina Castanon is a 32 y.o. male presenting for Foot Injury (left foot)  . 1. Have you been to the ER, urgent care clinic since your last visit? Hospitalized since your last visit? No    2. Have you seen or consulted any other health care providers outside of the [address] [place] [address] since your last visit? Include any pap smears or colon screening. No    No flowsheet data found. Abuse Screening Questionnaire 10/16/2017   Do you ever feel afraid of your partner? N   Are you in a relationship with someone who physically or mentally threatens you? N   Is it safe for you to go home? Y       No flowsheet data found. There are no discontinued medications.

## 2018-04-09 ENCOUNTER — OFFICE VISIT (OUTPATIENT)
Dept: INTERNAL MEDICINE CLINIC | Age: 28
End: 2018-04-09

## 2018-04-09 VITALS
WEIGHT: 197.8 LBS | HEART RATE: 93 BPM | BODY MASS INDEX: 31.04 KG/M2 | HEIGHT: 67 IN | DIASTOLIC BLOOD PRESSURE: 58 MMHG | OXYGEN SATURATION: 96 % | TEMPERATURE: 98.2 F | SYSTOLIC BLOOD PRESSURE: 127 MMHG | RESPIRATION RATE: 18 BRPM

## 2018-04-09 DIAGNOSIS — J40 BRONCHITIS: Primary | ICD-10-CM

## 2018-04-09 RX ORDER — DOXYCYCLINE 100 MG/1
100 TABLET ORAL 2 TIMES DAILY
Qty: 20 TAB | Refills: 0 | Status: SHIPPED | OUTPATIENT
Start: 2018-04-09 | End: 2018-09-25

## 2018-04-09 RX ORDER — ALBUTEROL SULFATE 90 UG/1
2 AEROSOL, METERED RESPIRATORY (INHALATION)
Qty: 1 INHALER | Refills: 0 | Status: SHIPPED | OUTPATIENT
Start: 2018-04-09 | End: 2020-01-24 | Stop reason: SDUPTHER

## 2018-04-09 NOTE — MR AVS SNAPSHOT
[address] [facility] [address] [address] [phone] Patient: Ariana Marrero MRN: [id number] :1990 Visit Information Date & Time Provider Department Dept. Phone Encounter #  
 2018  3:10 PM ANUJ Gallego MD [facility] [id number] Follow-up Instructions Return if symptoms worsen or fail to improve. Upcoming Health Maintenance Date Due DTaP/Tdap/Td series (1 - Tdap) 2009 Allergies as of 2018  Review Complete On: 2018 By: Linda Wahl MD  
  
 Severity Noted Reaction Type Reactions Bactrim [Sulfamethoxazole-trimethoprim] High 10/16/2017    Anaphylaxis Patient given Bactrim DS this morning for sinus infection. Sister called this afternoon patient is in ER with severe reaction; Dr. Demi Fischer notified Sulfa (Sulfonamide Antibiotics) High 10/30/2017   Not Verified Anaphylaxis Lorabid [Loracarbef]  2012    Hives Cefdinir Low 10/16/2017    Unknown (comments) Current Immunizations  Never Reviewed Name Date Influenza Vaccine (Quad) PF 2018 Not reviewed this visit You Were Diagnosed With   
  
 Codes Comments Bronchitis    -  Primary ICD-10-CM: J56 ICD-9-CM: 128 Vitals BP Pulse Temp Resp Height(growth percentile) Weight(growth percentile) 127/58 (BP 1 Location: Left arm, BP Patient Position: Sitting) 93 98.2 °F (36.8 °C) (Oral) 18 5' 7\" (1.702 m) 197 lb 12.8 oz (89.7 kg) SpO2 BMI Smoking Status 96% 30.98 kg/m2 Never Smoker BMI and BSA Data Body Mass Index Body Surface Area 30.98 kg/m 2 2.06 m 2 Preferred Pharmacy Pharmacy Name Phone Luis Marvin [address], [address] [place] [address] [phone] Your Updated Medication List  
  
   
This list is accurate as of 18  4:07 PM.  Always use your most recent med list.  
 albuterol 90 mcg/actuation inhaler Commonly known as:  PROVENTIL HFA, VENTOLIN HFA, PROAIR HFA Take 2 Puffs by inhalation every four (4) hours as needed (cough). benztropine 0.5 mg tablet Commonly known as:  COGENTIN Take 0.5 mg by mouth two (2) times a day. clindamycin 300 mg capsule Commonly known as:  CLEOCIN Take 1 Cap by mouth three (3) times daily. doxycycline 100 mg tablet Commonly known as:  ADOXA Take 1 Tab by mouth two (2) times a day. LORazepam 0.5 mg tablet Commonly known as:  ATIVAN Take 0.5 mg by mouth. ORAP 1 mg tablet Generic drug:  pimozide Take 4 mg by mouth. * guanFACINE ER 4 mg Tb24 ER tablet Commonly known as:  INTUNIV Take 1 Tab by mouth daily. * TENEX 2 mg IR tablet Generic drug:  guanFACINE IR Take 2 mg by mouth. * Notice: This list has 2 medication(s) that are the same as other medications prescribed for you. Read the directions carefully, and ask your doctor or other care provider to review them with you. Prescriptions Sent to Pharmacy Refills  
 albuterol (PROVENTIL HFA, VENTOLIN HFA, PROAIR HFA) 90 mcg/actuation inhaler 0 Sig: Take 2 Puffs by inhalation every four (4) hours as needed (cough). Class: Normal  
 Pharmacy: [address] Ph #: [phone] Route: Inhalation  
 doxycycline (ADOXA) 100 mg tablet 0 Sig: Take 1 Tab by mouth two (2) times a day. Class: Normal  
 Pharmacy: [address] Ph #: [phone] Route: Oral  
  
Follow-up Instructions Return if symptoms worsen or fail to improve. Introducing hospitals & HEALTH SERVICES! Homer Shepard introduces [employer] patient portal. Now you can access parts of your medical record, email your doctor's office, and request medication refills online.    
 
1. In your internet browser, go to [email or web address] 2. Click on the First Time User? Click Here link in the Sign In box. You will see the New Member Sign Up page. 3. Enter your [employer] Access Code exactly as it appears below. You will not need to use this code after youve completed the sign-up process. If you do not sign up before the expiration date, you must request a new code. · [employer] Access Code: [email or web address] Expires: 4/24/2018 10:38 AM 
 
4. Enter the last four digits of your Social Security Number (xxxx) and Date of Birth (mm/dd/yyyy) as indicated and click Submit. You will be taken to the next sign-up page. 5. Create a Sumbolat ID. This will be your [employer] login ID and cannot be changed, so think of one that is secure and easy to remember. 6. Create a [employer] password. You can change your password at any time. 7. Enter your Password Reset Question and Answer. This can be used at a later time if you forget your password. 8. Enter your e-mail address. You will receive e-mail notification when new information is available in [address] 9. Click Sign Up. You can now view and download portions of your medical record. 10. Click the Download Summary menu link to download a portable copy of your medical information. If you have questions, please visit the Frequently Asked Questions section of the [employer] website. Remember, [employer] is NOT to be used for urgent needs. For medical emergencies, dial 911. Now available from your iPhone and Android! Please provide this summary of care documentation to your next provider. Your primary care clinician is listed as ANUJ Joyce. If you have any questions after today's visit, please call [phone].

## 2018-04-09 NOTE — PROGRESS NOTES
This note will not be viewable in [address] Jeri Rolon is a 32 y.o. male and presents with Cough ((room 8)   chest congestion mucous not coming up)  . Subjective:  Mr. Myron Nur presents today with complaint of a cough this been present for the past week. He denies any fever chills rigors or pleuritic chest pain. He has minimal shortness of breath with exertion only. Cold air seems to be a trigger. He does not have a history of asthma. He states he used his father's inhaler and it did seem to help. He denies a history of seasonal allergies although his chart does list allergic rhinitis as a history. He is not taking any antihistamines at this point time. He did have some clindamycin left over from a previous encounter and started taking these about 7 days ago. His cough is productive of scant amount of sputum. It is mildly discolored. Past Medical History:   Diagnosis Date    ADHD (attention deficit hyperactivity disorder)     Allergic rhinitis 10/16/2017    Bipolar disorder ([facility] [address] 10/16/2017    Insomnia 10/16/2017    Otitis externa of right ear 10/16/2017    Tourette's 10/16/2017     Past Surgical History:   Procedure Laterality Date    HX HEENT      HX TONSIL AND ADENOIDECTOMY       Allergies   Allergen Reactions    Bactrim [Sulfamethoxazole-Trimethoprim] Anaphylaxis     Patient given Bactrim DS this morning for sinus infection. Sister called this afternoon patient is in ER with severe reaction; Dr. Chanda Mayberry notified    Sulfa (Sulfonamide Antibiotics) Anaphylaxis    Lorabid [Loracarbef] Hives    Cefdinir Unknown (comments)     Current Outpatient Prescriptions   Medication Sig Dispense Refill    albuterol (PROVENTIL HFA, VENTOLIN HFA, PROAIR HFA) 90 mcg/actuation inhaler Take 2 Puffs by inhalation every four (4) hours as needed (cough). 1 Inhaler 0    doxycycline (ADOXA) 100 mg tablet Take 1 Tab by mouth two (2) times a day.  20 Tab 0    clindamycin (CLEOCIN) 300 mg capsule Take 1 Cap by mouth three (3) times daily. 30 Cap 0    guanFACINE ER (INTUNIV) 4 mg Tb24 ER tablet Take 1 Tab by mouth daily.  pimozide (ORAP) 1 mg Tab Take 4 mg by mouth.  guanfacine (TENEX) 2 mg Tab Take 2 mg by mouth.  LORazepam (ATIVAN) 0.5 mg tablet Take 0.5 mg by mouth.  benztropine (COGENTIN) 0.5 mg tablet Take 0.5 mg by mouth two (2) times a day. Social History     Social History    Marital status: SINGLE     Spouse name: N/A    Number of children: N/A    Years of education: N/A     Social History Main Topics    Smoking status: Never Smoker    Smokeless tobacco: Never Used    Alcohol use Yes    Drug use: No    Sexual activity: Not Asked     Other Topics Concern    None     Social History Narrative     Family History   Problem Relation Age of Onset    Heart Disease Mother     No Known Problems Father        Review of Systems  Constitutional: negative for fevers, chills, anorexia and weight loss  Eyes:   negative for visual disturbance and irritation  ENT:   Positive for some sinus congestion and post nasal drainage. Respiratory:  Positive for cough and chest congestion without wheezing  CV:   negative for chest pain, palpitations, lower extremity edema  GI:   negative for nausea, vomiting, diarrhea, abdominal pain,melena  Integumentary: negative for rash and pruritus  Neurological:  negative for headaches, dizziness, vertigo, memory problems and gait       Objective:  Visit Vitals    /58 (BP 1 Location: Left arm, BP Patient Position: Sitting)    Pulse 93    Temp 98.2 °F (36.8 °C) (Oral)    Resp 18    Ht 5' 7\" (1.702 m)    Wt 197 lb 12.8 oz (89.7 kg)    SpO2 96%    BMI 30.98 kg/m2     Physical Exam:   General appearance - alert, ill appearing, and in no distress  Mental status - alert, oriented to person, place, and time  EYE-HENRY, EOMI, conjuctiva clear. No lid swelling or purulent drainage  ENT- TM's clear without A/F level.  Pharynx slightly erythematous with drainage noted  Nose - normal and patent, no erythema,  Neck - supple, no significant adenopathy   Chest -few upper airway rhonchi present without wheezing   Heart - normal rate, regular rhythm, normal S1, S2, no murmurs, rubs, clicks or gallops   Skin-No rash appreciated  Neuro -alert, oriented, normal speech, no focal findings. Assessment/Plan:  Diagnoses and all orders for this visit:    1. Bronchitis    Other orders  -     albuterol (PROVENTIL HFA, VENTOLIN HFA, PROAIR HFA) 90 mcg/actuation inhaler; Take 2 Puffs by inhalation every four (4) hours as needed (cough). -     doxycycline (ADOXA) 100 mg tablet; Take 1 Tab by mouth two (2) times a day. Patient may have recurring upper respiratory symptoms or bronchitis secondary to underlying allergic rhinitis or seasonal allergies. He will add Zyrtec or Claritin daily to his regimen and may use his Ventolin or Proventil inhaler on an as-needed basis for cough. If this persist or is recurring may consider formal pulmonary function test to evaluate for possible underlying asthma. Other Instructions:  Mucinex as directed    Increase po fluids    Follow-up Disposition:  Return if symptoms worsen or fail to improve. I have reviewed with the patient details of the assessment and plan and all questions were answered. Relevent patient education was performed. An After Visit Summary was printed and given to the patient.     Sade Bell MD

## 2018-04-09 NOTE — PROGRESS NOTES
Kar Bruno is a 32 y.o. male  Chief Complaint   Patient presents with    Cough     (room 8)   chest congestion mucous not coming up     Visit Vitals    /58 (BP 1 Location: Left arm, BP Patient Position: Sitting)    Pulse 93    Temp 98.2 °F (36.8 °C) (Oral)    Resp 18    Ht 5' 7\" (1.702 m)    Wt 197 lb 12.8 oz (89.7 kg)    SpO2 96%    BMI 30.98 kg/m2     1. Have you been to the ER, urgent care clinic since your last visit? Hospitalized since your last visit?  no    2. Have you seen or consulted any other health care providers outside of the [address] [place] [address] since your last visit? Include any pap smears or colon screening.  no

## 2018-09-25 ENCOUNTER — OFFICE VISIT (OUTPATIENT)
Dept: URGENT CARE | Age: 28
End: 2018-09-25

## 2018-09-25 VITALS
OXYGEN SATURATION: 98 % | SYSTOLIC BLOOD PRESSURE: 120 MMHG | BODY MASS INDEX: 29.4 KG/M2 | TEMPERATURE: 98.1 F | RESPIRATION RATE: 19 BRPM | WEIGHT: 194 LBS | HEART RATE: 88 BPM | HEIGHT: 68 IN | DIASTOLIC BLOOD PRESSURE: 64 MMHG

## 2018-09-25 DIAGNOSIS — R05.9 COUGH: Primary | ICD-10-CM

## 2018-09-25 RX ORDER — BENZONATATE 100 MG/1
100 CAPSULE ORAL
Qty: 21 CAP | Refills: 0 | Status: SHIPPED | OUTPATIENT
Start: 2018-09-25 | End: 2018-10-02

## 2018-09-25 NOTE — PROGRESS NOTES
HPI Comments:   Cough for past few hours  Dry non wheezy. No other symptoms. No preceding illness. hasnt tried any meds. Not improved yet. Patient is a 25 y.o. male presenting with cold symptoms. Cold Symptoms          Past Medical History:   Diagnosis Date    ADHD (attention deficit hyperactivity disorder)     Allergic rhinitis 10/16/2017    Bipolar disorder ([facility] [address] 10/16/2017    Insomnia 10/16/2017    Otitis externa of right ear 10/16/2017    Tourette's 10/16/2017        Past Surgical History:   Procedure Laterality Date    HX HEENT      HX TONSIL AND ADENOIDECTOMY           Family History   Problem Relation Age of Onset    Heart Disease Mother     No Known Problems Father         Social History     Social History    Marital status: SINGLE     Spouse name: N/A    Number of children: N/A    Years of education: N/A     Occupational History    Not on file. Social History Main Topics    Smoking status: Never Smoker    Smokeless tobacco: Never Used    Alcohol use Yes    Drug use: No    Sexual activity: Not on file     Other Topics Concern    Not on file     Social History Narrative                ALLERGIES: Bactrim [sulfamethoxazole-trimethoprim]; Sulfa (sulfonamide antibiotics); Lorabid [loracarbef]; and Cefdinir    Review of Systems   All other systems reviewed and are negative. Vitals:    09/25/18 1731   BP: 120/64   Pulse: 88   Resp: 19   Temp: 98.1 °F (36.7 °C)   SpO2: 98%   Weight: 194 lb (88 kg)   Height: 5' 8\" (1.727 m)       Physical Exam   Constitutional: He is oriented to person, place, and time. Well appearing   HENT:   Head: Normocephalic and atraumatic. Nose: Nose normal.   Mouth/Throat: Oropharynx is clear and moist.   TMs normal   Eyes: EOM are normal. Pupils are equal, round, and reactive to light. Neck: Neck supple. Cardiovascular: Normal rate, regular rhythm and normal heart sounds. Exam reveals no gallop and no friction rub.     No murmur heard.  Pulmonary/Chest: Effort normal and breath sounds normal. No respiratory distress. He has no wheezes. He has no rales. Lymphadenopathy:     He has no cervical adenopathy. Neurological: He is oriented to person, place, and time. Skin: No rash noted. Psychiatric: He has a normal mood and affect. His behavior is normal.       MDM     Differential Diagnosis; Clinical Impression; Plan:       CLINICAL IMPRESSION:  (R05) Cough  (primary encounter diagnosis)    Orders Placed This Encounter      benzonatate (TESSALON PERLES) 100 mg capsule      Plan:  1. Few hours of cough. Etiology to be determined. VS stable. No concerning findings. Follow up with PCP without improvement over next 7 days sooner/immediately for new or worsening      We have reviewed concerning signs/symptoms, normal vs abnormal progression of medical condition and when to seek immediate medical attention.         Procedures

## 2018-09-25 NOTE — MR AVS SNAPSHOT
[address] [address] 
[phone] Patient: Monty Del Cid MRN: [id number] :1990 Visit Information Date & Time Provider Department Dept. Phone Encounter #  
 2018  5:00 PM [address] [facility] [phone] [id number] Upcoming Health Maintenance Date Due DTaP/Tdap/Td series (1 - Tdap) 2009 Influenza Age 5 to Adult 2018 Allergies as of 2018  Review Complete On: 2018 By: Opal Bolton RN Severity Noted Reaction Type Reactions Bactrim [Sulfamethoxazole-trimethoprim] High 10/16/2017    Anaphylaxis Patient given Bactrim DS this morning for sinus infection. Sister called this afternoon patient is in ER with severe reaction; Dr. Chelsea Bazan notified Sulfa (Sulfonamide Antibiotics) High 10/30/2017   Not Verified Anaphylaxis Lorabid [Loracarbef]  2012    Hives Cefdinir Low 10/16/2017    Unknown (comments) Current Immunizations  Never Reviewed Name Date Influenza Vaccine (Quad) PF 2018 Not reviewed this visit You Were Diagnosed With   
  
 Codes Comments Cough    -  Primary ICD-10-CM: S76 ICD-9-CM: 212. 2 Vitals BP Pulse Temp Resp Height(growth percentile) Weight(growth percentile) 120/64 88 98.1 °F (36.7 °C) 19 5' 8\" (1.727 m) 194 lb (88 kg) SpO2 BMI Smoking Status 98% 29.5 kg/m2 Never Smoker BMI and BSA Data Body Mass Index Body Surface Area  
 29.5 kg/m 2 2.05 m 2 Preferred Pharmacy Pharmacy Name Phone [address]  [address] [phone] Your Updated Medication List  
  
   
This list is accurate as of 18  5:52 PM.  Always use your most recent med list.  
  
  
  
  
 albuterol 90 mcg/actuation inhaler Commonly known as:  PROVENTIL HFA, VENTOLIN HFA, PROAIR HFA  
 Take 2 Puffs by inhalation every four (4) hours as needed (cough). benzonatate 100 mg capsule Commonly known as:  TESSALON PERLES Take 1 Cap by mouth three (3) times daily as needed for Cough for up to 7 days. benztropine 0.5 mg tablet Commonly known as:  COGENTIN Take 0.5 mg by mouth two (2) times a day. LORazepam 0.5 mg tablet Commonly known as:  ATIVAN Take 0.5 mg by mouth. ORAP 1 mg tablet Generic drug:  pimozide Take 4 mg by mouth. * guanFACINE ER 4 mg Tb24 ER tablet Commonly known as:  INTUNIV Take 1 Tab by mouth daily. * TENEX 2 mg IR tablet Generic drug:  guanFACINE IR Take 2 mg by mouth. * Notice: This list has 2 medication(s) that are the same as other medications prescribed for you. Read the directions carefully, and ask your doctor or other care provider to review them with you. Prescriptions Sent to Pharmacy Refills  
 benzonatate (TESSALON PERLES) 100 mg capsule 0 Sig: Take 1 Cap by mouth three (3) times daily as needed for Cough for up to 7 days. Class: Normal  
 Pharmacy: [facility] [address] [place] [address]  [address]  #: [phone] Route: Oral  
  
Patient Instructions Follow up with PCP without improvement over next 7 days sooner/immediately for new or worsening Cough: Care Instructions Your Care Instructions A cough is your body's response to something that bothers your throat or airways. Many things can cause a cough. You might cough because of a cold or the flu, bronchitis, or asthma. Smoking, postnasal drip, allergies, and stomach acid that backs up into your throat also can cause coughs. A cough is a symptom, not a disease. Most coughs stop when the cause, such as a cold, goes away. You can take a few steps at home to cough less and feel better. Follow-up care is a key part of your treatment and safety.  Be sure to make and go to all appointments, and call your doctor if you are having problems. It's also a good idea to know your test results and keep a list of the medicines you take. How can you care for yourself at home? · Drink lots of water and other fluids. This helps thin the mucus and soothes a dry or sore throat. Honey or lemon juice in hot water or tea may ease a dry cough. · Take cough medicine as directed by your doctor. · Prop up your head on pillows to help you breathe and ease a dry cough. · Try cough drops to soothe a dry or sore throat. Cough drops don't stop a cough. Medicine-flavored cough drops are no better than candy-flavored drops or hard candy. · Do not smoke. Avoid secondhand smoke. If you need help quitting, talk to your doctor about stop-smoking programs and medicines. These can increase your chances of quitting for good. When should you call for help? Call 911 anytime you think you may need emergency care. For example, call if: 
  · You have severe trouble breathing.  
 Call your doctor now or seek immediate medical care if: 
  · You cough up blood.  
  · You have new or worse trouble breathing.  
  · You have a new or higher fever.  
  · You have a new rash.  
 Watch closely for changes in your health, and be sure to contact your doctor if: 
  · You cough more deeply or more often, especially if you notice more mucus or a change in the color of your mucus.  
  · You have new symptoms, such as a sore throat, an earache, or sinus pain.  
  · You do not get better as expected. Where can you learn more? Go to http://yue-sanjeev.info/. Enter D279 in the search box to learn more about \"Cough: Care Instructions. \" Current as of: December 6, 2017 Content Version: 11.7 © 0536-3412 [employer].  Care instructions adapted under license by [employer] (which disclaims liability or warranty for this information). If you have questions about a medical condition or this instruction, always ask your healthcare professional. [address] any warranty or liability for your use of this information. Introducing [facility] SERVICES! Shanice Adrian introduces [employer] patient portal. Now you can access parts of your medical record, email your doctor's office, and request medication refills online. 1. In your internet browser, go to [email or web address] 2. Click on the First Time User? Click Here link in the Sign In box. You will see the New Member Sign Up page. 3. Enter your [employer] Access Code exactly as it appears below. You will not need to use this code after youve completed the sign-up process. If you do not sign up before the expiration date, you must request a new code. · [employer] Access Code: [email or web address] Expires: 12/24/2018  4:57 PM 
 
4. Enter the last four digits of your Social Security Number (xxxx) and Date of Birth (mm/dd/yyyy) as indicated and click Submit. You will be taken to the next sign-up page. 5. Create a [employer] ID. This will be your [employer] login ID and cannot be changed, so think of one that is secure and easy to remember. 6. Create a [employer] password. You can change your password at any time. 7. Enter your Password Reset Question and Answer. This can be used at a later time if you forget your password. 8. Enter your e-mail address. You will receive e-mail notification when new information is available in [address] 9. Click Sign Up. You can now view and download portions of your medical record. 10. Click the Download Summary menu link to download a portable copy of your medical information. If you have questions, please visit the Frequently Asked Questions section of the [employer] website. Remember, [employer] is NOT to be used for urgent needs. For medical emergencies, dial 911. Now available from your iPhone and Android! Please provide this summary of care documentation to your next provider. Your primary care clinician is listed as ANUJ Schmidt. If you have any questions after today's visit, please call [phone].

## 2018-09-25 NOTE — PATIENT INSTRUCTIONS
Follow up with PCP without improvement over next 7 days sooner/immediately for new or worsening       Cough: Care Instructions  Your Care Instructions    A cough is your body's response to something that bothers your throat or airways. Many things can cause a cough. You might cough because of a cold or the flu, bronchitis, or asthma. Smoking, postnasal drip, allergies, and stomach acid that backs up into your throat also can cause coughs. A cough is a symptom, not a disease. Most coughs stop when the cause, such as a cold, goes away. You can take a few steps at home to cough less and feel better. Follow-up care is a key part of your treatment and safety. Be sure to make and go to all appointments, and call your doctor if you are having problems. It's also a good idea to know your test results and keep a list of the medicines you take. How can you care for yourself at home? · Drink lots of water and other fluids. This helps thin the mucus and soothes a dry or sore throat. Honey or lemon juice in hot water or tea may ease a dry cough. · Take cough medicine as directed by your doctor. · Prop up your head on pillows to help you breathe and ease a dry cough. · Try cough drops to soothe a dry or sore throat. Cough drops don't stop a cough. Medicine-flavored cough drops are no better than candy-flavored drops or hard candy. · Do not smoke. Avoid secondhand smoke. If you need help quitting, talk to your doctor about stop-smoking programs and medicines. These can increase your chances of quitting for good. When should you call for help? Call 911 anytime you think you may need emergency care.  For example, call if:    · You have severe trouble breathing.    Call your doctor now or seek immediate medical care if:    · You cough up blood.     · You have new or worse trouble breathing.     · You have a new or higher fever.     · You have a new rash.    Watch closely for changes in your health, and be sure to contact your doctor if:    · You cough more deeply or more often, especially if you notice more mucus or a change in the color of your mucus.     · You have new symptoms, such as a sore throat, an earache, or sinus pain.     · You do not get better as expected. Where can you learn more? Go to http://yue-sanjeev.info/. Enter D279 in the search box to learn more about \"Cough: Care Instructions. \"  Current as of: December 6, 2017  Content Version: 11.7  © 1763-9048 [employer]. Care instructions adapted under license by [employer] (which disclaims liability or warranty for this information). If you have questions about a medical condition or this instruction, always ask your healthcare professional. [address] any warranty or liability for your use of this information.

## 2018-10-02 ENCOUNTER — OFFICE VISIT (OUTPATIENT)
Dept: INTERNAL MEDICINE CLINIC | Age: 28
End: 2018-10-02

## 2018-10-02 VITALS
BODY MASS INDEX: 29.1 KG/M2 | SYSTOLIC BLOOD PRESSURE: 119 MMHG | DIASTOLIC BLOOD PRESSURE: 87 MMHG | HEART RATE: 111 BPM | HEIGHT: 68 IN | OXYGEN SATURATION: 97 % | WEIGHT: 192 LBS | TEMPERATURE: 99.1 F

## 2018-10-02 DIAGNOSIS — J01.90 ACUTE SINUSITIS, RECURRENCE NOT SPECIFIED, UNSPECIFIED LOCATION: Primary | ICD-10-CM

## 2018-10-02 RX ORDER — AZITHROMYCIN 250 MG/1
TABLET, FILM COATED ORAL
Qty: 6 TAB | Refills: 0 | Status: SHIPPED | OUTPATIENT
Start: 2018-10-02 | End: 2018-11-21

## 2018-10-02 NOTE — PROGRESS NOTES
Subjective:  Mr. Camille Regan is a pleasant 25year old young man with Tourette's syndrome, who comes in today for evaluation of persistent nasal congestion, yellowish nasal drainage and fever. He denies any shortness of breath and has had an occasional cough that he attributes to his postnasal drainage. When all of his symptoms developed last week he was seen at [facility] and told this was viral in nature. He was given some Tessalon Perles and treated conservatively with Tylenol. Although he has followed these directions faithfully he has continued to have a fair amount of pain over his sinuses. He also tells me today that he's not been using his Orap for his Tourette's for greater than two weeks. He does not like the side effects. Past Medical History:   Diagnosis Date    ADHD (attention deficit hyperactivity disorder)     Allergic rhinitis 10/16/2017    Bipolar disorder ([facility] [address] 10/16/2017    Insomnia 10/16/2017    Otitis externa of right ear 10/16/2017    Tourette's 10/16/2017     Past Surgical History:   Procedure Laterality Date    HX HEENT      HX TONSIL AND ADENOIDECTOMY         Current Outpatient Prescriptions on File Prior to Visit   Medication Sig Dispense Refill    albuterol (PROVENTIL HFA, VENTOLIN HFA, PROAIR HFA) 90 mcg/actuation inhaler Take 2 Puffs by inhalation every four (4) hours as needed (cough). 1 Inhaler 0    guanfacine (TENEX) 2 mg Tab Take 2 mg by mouth.  benztropine (COGENTIN) 0.5 mg tablet Take 0.5 mg by mouth two (2) times a day.  benzonatate (TESSALON PERLES) 100 mg capsule Take 1 Cap by mouth three (3) times daily as needed for Cough for up to 7 days. 21 Cap 0    guanFACINE ER (INTUNIV) 4 mg Tb24 ER tablet Take 1 Tab by mouth daily.  LORazepam (ATIVAN) 0.5 mg tablet Take 0.5 mg by mouth. No current facility-administered medications on file prior to visit.       Allergies   Allergen Reactions    Bactrim [Sulfamethoxazole-Trimethoprim] Anaphylaxis     Patient given Bactrim DS this morning for sinus infection. Sister called this afternoon patient is in ER with severe reaction; Dr. Daniel Patrick notified    Sulfa (Sulfonamide Antibiotics) Anaphylaxis    Lorabid [Loracarbef] Hives    Cefdinir Unknown (comments)       Physical Examination:  GENERAL:  Pleasant, young man in no acute distress. He is alert and oriented. VITALS:  BP: 119/87. P: 111. O2 sat: 97.  T: 99.1. HEENT:  Normocephalic, atraumatic. TMs mildly erythematous bilaterally. Mouth mucosa pink. Tongue midline. Pharynx erythematous without presence of exudates. He is mildly tender over his maxillary sinuses. NECK:  Supple without adenopathy. CHEST:  Lungs clear to auscultation, no rales or wheezes. CV:  Heart regular rhythm without murmur. EXTREMITIES:  No edema or calf tenderness. Distal pulses were present. Impression:  1. Acute sinusitis. Plan:  1. Since he's been off Orap for two weeks it was opted to start him on a Z-Gunnar.  2. He may use Claritin or Zyrtec daily while on the antibiotic. 3. He is to increase fluids and rest.  4. He certainly will contact us should he fail to improve or if his symptoms worsen.

## 2018-10-02 NOTE — MR AVS SNAPSHOT
[address] [facility] [address] [id number] [phone] Patient: Marisol Brand MRN: [id number] :1990 Visit Information Date & Time Provider Department Dept. Phone Encounter #  
 10/2/2018  2:45 PM Mike Blancas NP [address] [place] [address] [phone] [id number] Follow-up Instructions Return if symptoms worsen or fail to improve. Upcoming Health Maintenance Date Due DTaP/Tdap/Td series (1 - Tdap) 2009 Influenza Age 5 to Adult 2018 Allergies as of 10/2/2018  Review Complete On: 10/2/2018 By: Mike Blancas NP Severity Noted Reaction Type Reactions Bactrim [Sulfamethoxazole-trimethoprim] High 10/16/2017    Anaphylaxis Patient given Bactrim DS this morning for sinus infection. Sister called this afternoon patient is in ER with severe reaction; Dr. Elva Cordova notified Sulfa (Sulfonamide Antibiotics) High 10/30/2017   Not Verified Anaphylaxis Lorabid [Loracarbef]  2012    Hives Cefdinir Low 10/16/2017    Unknown (comments) Current Immunizations  Never Reviewed Name Date Influenza Vaccine (Quad) PF 2018 Not reviewed this visit You Were Diagnosed With   
  
 Codes Comments Acute sinusitis, recurrence not specified, unspecified location    -  Primary ICD-10-CM: J01.90 ICD-9-CM: 461.9 Vitals BP Pulse Temp Height(growth percentile) Weight(growth percentile) SpO2  
 119/87 (BP 1 Location: Left arm, BP Patient Position: Sitting) (!) 111 99.1 °F (37.3 °C) (Oral) 5' 8\" (1.727 m) 192 lb (87.1 kg) 97% BMI Smoking Status 29.19 kg/m2 Never Smoker BMI and BSA Data Body Mass Index Body Surface Area  
 29.19 kg/m 2 2.04 m 2 Preferred Pharmacy Pharmacy Name Phone [facility] [address], [address] [place] [address] Susy Erazo [phone] Your Updated Medication List  
  
   
This list is accurate as of 10/2/18  3:18 PM.  Always use your most recent med list.  
  
  
  
  
 albuterol 90 mcg/actuation inhaler Commonly known as:  PROVENTIL HFA, VENTOLIN HFA, PROAIR HFA Take 2 Puffs by inhalation every four (4) hours as needed (cough). azithromycin 250 mg tablet Commonly known as:  Friaprilmando Dickinson Take 2 tablets initially then one tablet daily until completed  
  
 benzonatate 100 mg capsule Commonly known as:  TESSALON PERLES Take 1 Cap by mouth three (3) times daily as needed for Cough for up to 7 days. benztropine 0.5 mg tablet Commonly known as:  COGENTIN Take 0.5 mg by mouth two (2) times a day. LORazepam 0.5 mg tablet Commonly known as:  ATIVAN Take 0.5 mg by mouth. * guanFACINE ER 4 mg Tb24 ER tablet Commonly known as:  INTUNIV Take 1 Tab by mouth daily. * TENEX 2 mg IR tablet Generic drug:  guanFACINE IR Take 2 mg by mouth. * Notice: This list has 2 medication(s) that are the same as other medications prescribed for you. Read the directions carefully, and ask your doctor or other care provider to review them with you. Prescriptions Sent to Pharmacy Refills  
 azithromycin (ZITHROMAX) 250 mg tablet 0 Sig: Take 2 tablets initially then one tablet daily until completed Class: Normal  
 Pharmacy: [facility] [address] [place] [address] [facility] #: [phone] Follow-up Instructions Return if symptoms worsen or fail to improve. Patient Instructions Sinusitis: Care Instructions Your Care Instructions Sinusitis is an infection of the lining of the sinus cavities in your head. Sinusitis often follows a cold. It causes pain and pressure in your head and face. In most cases, sinusitis gets better on its own in 1 to 2 weeks. But some mild symptoms may last for several weeks. Sometimes antibiotics are needed. Follow-up care is a key part of your treatment and safety. Be sure to make and go to all appointments, and call your doctor if you are having problems. It's also a good idea to know your test results and keep a list of the medicines you take. How can you care for yourself at home? · Take an over-the-counter pain medicine, such as acetaminophen (Tylenol), ibuprofen (Advil, Motrin), or naproxen (Aleve). Read and follow all instructions on the label. · If the doctor prescribed antibiotics, take them as directed. Do not stop taking them just because you feel better. You need to take the full course of antibiotics. · Be careful when taking over-the-counter cold or flu medicines and Tylenol at the same time. Many of these medicines have acetaminophen, which is Tylenol. Read the labels to make sure that you are not taking more than the recommended dose. Too much acetaminophen (Tylenol) can be harmful. · Breathe warm, moist air from a steamy shower, a hot bath, or a sink filled with hot water. Avoid cold, dry air. Using a humidifier in your home may help. Follow the directions for cleaning the machine. · Use saline (saltwater) nasal washes to help keep your nasal passages open and wash out mucus and bacteria. You can buy saline nose drops at a grocery store or drugstore. Or you can make your own at home by adding 1 teaspoon of salt and 1 teaspoon of baking soda to 2 cups of distilled water. If you make your own, fill a bulb syringe with the solution, insert the tip into your nostril, and squeeze gently. Deitra Screen your nose. · Put a hot, wet towel or a warm gel pack on your face 3 or 4 times a day for 5 to 10 minutes each time. · Try a decongestant nasal spray like oxymetazoline (Afrin). Do not use it for more than 3 days in a row. Using it for more than 3 days can make your congestion worse. When should you call for help? Call your doctor now or seek immediate medical care if:   · You have new or worse swelling or redness in your face or around your eyes.  
  · You have a new or higher fever.  
 Watch closely for changes in your health, and be sure to contact your doctor if: 
  · You have new or worse facial pain.  
  · The mucus from your nose becomes thicker (like pus) or has new blood in it.  
  · You are not getting better as expected. Where can you learn more? Go to http://yue-sanjeev.info/. Enter H967 in the search box to learn more about \"Sinusitis: Care Instructions. \" Current as of: May 12, 2017 Content Version: 11.7 © 7398-8730 [employer]. Care instructions adapted under license by [employer] (which disclaims liability or warranty for this information). If you have questions about a medical condition or this instruction, always ask your healthcare professional. [address] any warranty or liability for your use of this information. Introducing [facility] & HEALTH SERVICES! [facility] introduces [employer] patient portal. Now you can access parts of your medical record, email your doctor's office, and request medication refills online. 1. In your internet browser, go to [email or web address] 2. Click on the First Time User? Click Here link in the Sign In box. You will see the New Member Sign Up page. 3. Enter your [employer] Access Code exactly as it appears below. You will not need to use this code after youve completed the sign-up process. If you do not sign up before the expiration date, you must request a new code. · [employer] Access Code: [email or web address] Expires: 12/24/2018  4:57 PM 
 
4. Enter the last four digits of your Social Security Number (xxxx) and Date of Birth (mm/dd/yyyy) as indicated and click Submit. You will be taken to the next sign-up page. 5. Create a Sealed ID.  This will be your [employer] login ID and cannot be changed, so think of one that is secure and easy to remember. 6. Create a hopscout password. You can change your password at any time. 7. Enter your Password Reset Question and Answer. This can be used at a later time if you forget your password. 8. Enter your e-mail address. You will receive e-mail notification when new information is available in [address] 9. Click Sign Up. You can now view and download portions of your medical record. 10. Click the Download Summary menu link to download a portable copy of your medical information. If you have questions, please visit the Frequently Asked Questions section of the [employer]out website. Remember, [employer] is NOT to be used for urgent needs. For medical emergencies, dial 911. Now available from your iPhone and Android! Please provide this summary of care documentation to your next provider. Your primary care clinician is listed as ANUJ Molina. If you have any questions after today's visit, please call [phone].

## 2018-10-02 NOTE — LETTER
NOTIFICATION RETURN TO WORK / SCHOOL 
 
10/2/2018 3:04 PM 
 
Mr. Haig Holter 
[address] [facility] [address] [address] To Whom It May Concern: 
 
Haig Holter is currently under the care of Hansa Lamb. He will return to work on Thursday October 4,2018. He is to be excuse for Sunday and Tuesday. If there are questions or concerns please have the patient contact our office.  
 
 
 
Sincerely, 
 
 
Jamie Robins NP

## 2018-10-02 NOTE — PATIENT INSTRUCTIONS
Sinusitis: Care Instructions  Your Care Instructions    Sinusitis is an infection of the lining of the sinus cavities in your head. Sinusitis often follows a cold. It causes pain and pressure in your head and face. In most cases, sinusitis gets better on its own in 1 to 2 weeks. But some mild symptoms may last for several weeks. Sometimes antibiotics are needed. Follow-up care is a key part of your treatment and safety. Be sure to make and go to all appointments, and call your doctor if you are having problems. It's also a good idea to know your test results and keep a list of the medicines you take. How can you care for yourself at home? · Take an over-the-counter pain medicine, such as acetaminophen (Tylenol), ibuprofen (Advil, Motrin), or naproxen (Aleve). Read and follow all instructions on the label. · If the doctor prescribed antibiotics, take them as directed. Do not stop taking them just because you feel better. You need to take the full course of antibiotics. · Be careful when taking over-the-counter cold or flu medicines and Tylenol at the same time. Many of these medicines have acetaminophen, which is Tylenol. Read the labels to make sure that you are not taking more than the recommended dose. Too much acetaminophen (Tylenol) can be harmful. · Breathe warm, moist air from a steamy shower, a hot bath, or a sink filled with hot water. Avoid cold, dry air. Using a humidifier in your home may help. Follow the directions for cleaning the machine. · Use saline (saltwater) nasal washes to help keep your nasal passages open and wash out mucus and bacteria. You can buy saline nose drops at a grocery store or drugstore. Or you can make your own at home by adding 1 teaspoon of salt and 1 teaspoon of baking soda to 2 cups of distilled water. If you make your own, fill a bulb syringe with the solution, insert the tip into your nostril, and squeeze gently. Beny Amsler your nose.   · Put a hot, wet towel or a warm gel pack on your face 3 or 4 times a day for 5 to 10 minutes each time. · Try a decongestant nasal spray like oxymetazoline (Afrin). Do not use it for more than 3 days in a row. Using it for more than 3 days can make your congestion worse. When should you call for help? Call your doctor now or seek immediate medical care if:    · You have new or worse swelling or redness in your face or around your eyes.     · You have a new or higher fever.    Watch closely for changes in your health, and be sure to contact your doctor if:    · You have new or worse facial pain.     · The mucus from your nose becomes thicker (like pus) or has new blood in it.     · You are not getting better as expected. Where can you learn more? Go to http://yue-sanjeev.info/. Enter J025 in the search box to learn more about \"Sinusitis: Care Instructions. \"  Current as of: May 12, 2017  Content Version: 11.7  © 3980-7071 [employer]. Care instructions adapted under license by [employer] (which disclaims liability or warranty for this information). If you have questions about a medical condition or this instruction, always ask your healthcare professional. [address] any warranty or liability for your use of this information.

## 2018-10-02 NOTE — PROGRESS NOTES
Kalyani Vergara presents with   Chief Complaint   Patient presents with    Cough    Cold Symptoms   Patient of [facility] here with complaint of a cough, nasal drainage & fever. States fever last night was 102.2. Was seen at [facility] last week & given Tessalon Perles. 1. Have you been to the ER, urgent care clinic since your last visit? Hospitalized since your last visit? Yes When: 09/2018 Where: [facility] Reason for visit: cough    2. Have you seen or consulted any other health care providers outside of the [address] [place] [address] since your last visit? Include any pap smears or colon screening.  No

## 2018-11-21 ENCOUNTER — OFFICE VISIT (OUTPATIENT)
Dept: URGENT CARE | Age: 28
End: 2018-11-21

## 2018-11-21 VITALS
HEART RATE: 77 BPM | OXYGEN SATURATION: 98 % | RESPIRATION RATE: 16 BRPM | HEIGHT: 68 IN | DIASTOLIC BLOOD PRESSURE: 76 MMHG | TEMPERATURE: 98.5 F | SYSTOLIC BLOOD PRESSURE: 117 MMHG | BODY MASS INDEX: 29.1 KG/M2 | WEIGHT: 192 LBS

## 2018-11-21 DIAGNOSIS — S43.422A SPRAIN OF LEFT ROTATOR CUFF CAPSULE, INITIAL ENCOUNTER: Primary | ICD-10-CM

## 2018-11-21 NOTE — LETTER
[address] [address] 
[phone] Work/School Note Date: 11/21/2018 To Whom It May concern: 
 
Mackenzie Fields was seen and treated today in the urgent care center by the following provider(s): 
No providers found. Mackenzie Fields may return to work on 11/21/18 with light work- no lifting/pushing/ pulling with left arm > 15 lb and no overhead activity x 5 days. Sincerely, Kinjal Katz MD

## 2018-11-21 NOTE — PATIENT INSTRUCTIONS
Ice- - aleve/ motrin      Rotator Cuff: Exercises  Your Care Instructions  Here are some examples of typical rehabilitation exercises for your condition. Start each exercise slowly. Ease off the exercise if you start to have pain. Your doctor or physical therapist will tell you when you can start these exercises and which ones will work best for you. How to do the exercises  Pendulum swing    1. Hold on to a table or the back of a chair with your good arm. Then bend forward a little and let your sore arm hang straight down. This exercise does not use the arm muscles. Rather, use your legs and your hips to create movement that makes your arm swing freely. 2. Use the movement from your hips and legs to guide the slightly swinging arm back and forth like a pendulum (or elephant trunk). Then guide it in circles that start small (about the size of a dinner plate). Make the circles a bit larger each day, as your pain allows. 3. Do this exercise for 5 minutes, 5 to 7 times each day. 4. As you have less pain, try bending over a little farther to do this exercise. This will increase the amount of movement at your shoulder. Posterior stretching exercise    1. Hold the elbow of your injured arm with your other hand. 2. Use your hand to pull your injured arm gently up and across your body. You will feel a gentle stretch across the back of your injured shoulder. 3. Hold for at least 15 to 30 seconds. Then slowly lower your arm. 4. Repeat 2 to 4 times. Up-the-back stretch    1. Put your hand in your back pocket. Let it rest there to stretch your shoulder. 2. With your other hand, hold your injured arm (palm outward) behind your back by the wrist. Pull your arm up gently to stretch your shoulder. 3. Next, put a towel over your other shoulder. Put the hand of your injured arm behind your back. Now hold the back end of the towel. With the other hand, hold the front end of the towel in front of your body.  Pull gently on the front end of the towel. This will bring your hand farther up your back to stretch your shoulder. Overhead stretch    1. Standing about an arm's length away, grasp onto a solid surface. You could use a countertop, a doorknob, or the back of a sturdy chair. 2. With your knees slightly bent, bend forward with your arms straight. Lower your upper body, and let your shoulders stretch. 3. As your shoulders are able to stretch farther, you may need to take a step or two backward. 4. Hold for at least 15 to 30 seconds. Then stand up and relax. If you had stepped back during your stretch, step forward so you can keep your hands on the solid surface. 5. Repeat 2 to 4 times. Shoulder flexion (lying down)    1. Lie on your back, holding a wand with both hands. Your palms should face down as you hold the wand. 2. Keeping your elbows straight, slowly raise your arms over your head. Raise them until you feel a stretch in your shoulders, upper back, and chest.  3. Hold for 15 to 30 seconds. 4. Repeat 2 to 4 times. Shoulder rotation (lying down)    1. Lie on your back. Hold a wand with both hands with your elbows bent and palms up. 2. Keep your elbows close to your body, and move the wand across your body toward the sore arm. 3. Hold for 8 to 12 seconds. 4. Repeat 2 to 4 times. Wall climbing (to the side)    1. Stand with your side to a wall so that your fingers can just touch it at an angle about 30 degrees toward the front of your body. 2. Walk the fingers of your injured arm up the wall as high as pain permits. Try not to shrug your shoulder up toward your ear as you move your arm up. 3. Hold that position for a count of at least 15 to 20.  4. Walk your fingers back down to the starting position. 5. Repeat at least 2 to 4 times. Try to reach higher each time. Wall climbing (to the front)    1. Face a wall, and stand so your fingers can just touch it.   2. Keeping your shoulder down, walk the fingers of your injured arm up the wall as high as pain permits. (Don't shrug your shoulder up toward your ear.)  3. Hold your arm in that position for at least 15 to 30 seconds. 4. Slowly walk your fingers back down to where you started. 5. Repeat at least 2 to 4 times. Try to reach higher each time. Shoulder blade squeeze    1. Stand with your arms at your sides, and squeeze your shoulder blades together. Do not raise your shoulders up as you squeeze. 2. Hold 6 seconds. 3. Repeat 8 to 12 times. Scapular exercise: Arm reach    1. Lie flat on your back. This exercise is a very slight motion that starts with your arms raised (elbows straight, arms straight). 2. From this position, reach higher toward the ruperto or ceiling. Keep your elbows straight. All motion should be from your shoulder blade only. 3. Relax your arms back to where you started. 4. Repeat 8 to 12 times. Arm raise to the side    1. Slowly raise your injured arm to the side, with your thumb facing up. Raise your arm 60 degrees at the most (shoulder level is 90 degrees). 2. Hold the position for 3 to 5 seconds. Then lower your arm back to your side. If you need to, bring your \"good\" arm across your body and place it under the elbow as you lower your injured arm. Use your good arm to keep your injured arm from dropping down too fast.  3. Repeat 8 to 12 times. 4. When you first start out, don't hold any extra weight in your hand. As you get stronger, you may use a 1-pound to 2-pound dumbbell or a small can of food. Shoulder flexor and extensor exercise    1. Push forward (flex): Stand facing a wall or doorjamb, about 6 inches or less back. Hold your injured arm against your body. Make a closed fist with your thumb on top. Then gently push your hand forward into the wall with about 25% to 50% of your strength. Don't let your body move backward as you push. Hold for about 6 seconds. Relax for a few seconds. Repeat 8 to 12 times.   2. Push backward (extend): Stand with your back flat against a wall. Your upper arm should be against the wall, with your elbow bent 90 degrees (your hand straight ahead). Push your elbow gently back against the wall with about 25% to 50% of your strength. Don't let your body move forward as you push. Hold for about 6 seconds. Relax for a few seconds. Repeat 8 to 12 times. Scapular exercise: Wall push-ups    1. Stand facing a wall, about 12 inches to 18 inches away. 2. Place your hands on the wall at shoulder height. 3. Slowly bend your elbows and bring your face to the wall. Keep your back and hips straight. 4. Push back to where you started. 5. Repeat 8 to 12 times. 6. When you can do this exercise against a wall comfortably, you can try it against a counter. You can then slowly progress to the end of a couch, then to a sturdy chair, and finally to the floor. Scapular exercise: Retraction    1. Put the band around a solid object at about waist level. (A bedpost will work well.) Each hand should hold an end of the band. 2. With your elbows at your sides and bent to 90 degrees, pull the band back. Your shoulder blades should move toward each other. Then move your arms back where you started. 3. Repeat 8 to 12 times. 4. If you have good range of motion in your shoulders, try this exercise with your arms lifted out to the sides. Keep your elbows at a 90-degree angle. Raise the elastic band up to about shoulder level. Pull the band back to move your shoulder blades toward each other. Then move your arms back where you started. Internal rotator strengthening exercise    1. Start by tying a piece of elastic exercise material to a doorknob. You can use surgical tubing or Thera-Band. 2. Stand or sit with your shoulder relaxed and your elbow bent 90 degrees. Your upper arm should rest comfortably against your side. Squeeze a rolled towel between your elbow and your body for comfort.  This will help keep your arm at your side. 3. Hold one end of the elastic band in the hand of the painful arm. 4. Slowly rotate your forearm toward your body until it touches your belly. Slowly move it back to where you started. 5. Keep your elbow and upper arm firmly tucked against the towel roll or at your side. 6. Repeat 8 to 12 times. External rotator strengthening exercise    1. Start by tying a piece of elastic exercise material to a doorknob. You can use surgical tubing or Thera-Band. (You may also hold one end of the band in each hand.)  2. Stand or sit with your shoulder relaxed and your elbow bent 90 degrees. Your upper arm should rest comfortably against your side. Squeeze a rolled towel between your elbow and your body for comfort. This will help keep your arm at your side. 3. Hold one end of the elastic band with the hand of the painful arm. 4. Start with your forearm across your belly. Slowly rotate the forearm out away from your body. Keep your elbow and upper arm tucked against the towel roll or the side of your body until you begin to feel tightness in your shoulder. Slowly move your arm back to where you started. 5. Repeat 8 to 12 times. Follow-up care is a key part of your treatment and safety. Be sure to make and go to all appointments, and call your doctor if you are having problems. It's also a good idea to know your test results and keep a list of the medicines you take. Where can you learn more? Go to http://yue-sanjeev.info/. Enter Dinora Cohen in the search box to learn more about \"Rotator Cuff: Exercises. \"  Current as of: November 29, 2017  Content Version: 11.8  © 6033-2115 [employer]. Care instructions adapted under license by [employer] (which disclaims liability or warranty for this information).  If you have questions about a medical condition or this instruction, always ask your healthcare professional. Andrews Lewis disclaims any warranty or liability for your use of this information.

## 2018-11-22 NOTE — PROGRESS NOTES
Shoulder Pain   This is a new problem. Episode onset: PTA- felt pop/ snap on left shoulder,  when doing overhead activity- moving boxes with 15-20 lb weight  The problem occurs constantly. The problem has not changed since onset. Exacerbated by: shoulder movement. The symptoms are relieved by rest. He has tried nothing for the symptoms. Past Medical History:   Diagnosis Date    ADHD (attention deficit hyperactivity disorder)     Allergic rhinitis 10/16/2017    Bipolar disorder ([facility] [address] 10/16/2017    Insomnia 10/16/2017    Otitis externa of right ear 10/16/2017    Tourette's 10/16/2017        Past Surgical History:   Procedure Laterality Date    HX HEENT      HX TONSIL AND ADENOIDECTOMY           Family History   Problem Relation Age of Onset    Heart Disease Mother     No Known Problems Father         Social History     Socioeconomic History    Marital status: SINGLE     Spouse name: Not on file    Number of children: Not on file    Years of education: Not on file    Highest education level: Not on file   Social Needs    Financial resource strain: Not on file    Food insecurity - worry: Not on file    Food insecurity - inability: Not on file   [email or web address][employer]me needs - medical: Not on file   [email or web address] needs - non-medical: Not on file   Occupational History    Not on file   Tobacco Use    Smoking status: Never Smoker    Smokeless tobacco: Never Used   Substance and Sexual Activity    Alcohol use: Yes    Drug use: No    Sexual activity: Not on file   Other Topics Concern    Not on file   Social History Narrative    Not on file                ALLERGIES: Bactrim [sulfamethoxazole-trimethoprim]; Sulfa (sulfonamide antibiotics); Lorabid [loracarbef]; and Cefdinir    Review of Systems   All other systems reviewed and are negative.       Vitals:    11/21/18 1143   BP: 117/76   Pulse: 77   Resp: 16   Temp: 98.5 °F (36.9 °C)   SpO2: 98%   Weight: 192 lb (87.1 kg)   Height: 5' 8\" (1.727 m) Physical Exam   Constitutional: No distress. Musculoskeletal:        Right shoulder: Normal.        Left shoulder: He exhibits pain. He exhibits normal range of motion, no tenderness, no bony tenderness, no swelling, no effusion, no deformity, no spasm and normal strength. Left elbow: Normal.        Cervical back: Normal.   Nursing note and vitals reviewed. MDM    Procedures      ICD-10-CM ICD-9-CM    1. Sprain of left rotator cuff capsule, initial encounter S43.422A 840.4 XR SHOULDER LT AP/LAT MIN 2 V       Self exercise  Motrin  Follow wity PCP/ ortho if sxs present  Modified- light work     No orders of the defined types were placed in this encounter. Results for orders placed or performed in visit on 11/21/18   XR SHOULDER LT AP/LAT MIN 2 V    Narrative    INDICATION: Left shoulder pain. Exam: Three views of the left shoulder. FINDINGS: There is no acute fracture or dislocation. Articulations are normal.  Bones are well mineralized and soft tissues are normal.      Impression    IMPRESSION: No acute fracture or dislocation. The patients condition was discussed with the patient and they understand. The patient is to follow up with primary care doctor. If signs and symptoms become worse the pt is to go to the ER. The patient is to take medications as prescribed.

## 2020-01-06 ENCOUNTER — OFFICE VISIT (OUTPATIENT)
Dept: INTERNAL MEDICINE CLINIC | Age: 30
End: 2020-01-06

## 2020-01-06 VITALS
HEIGHT: 68 IN | OXYGEN SATURATION: 98 % | BODY MASS INDEX: 28.95 KG/M2 | TEMPERATURE: 98.9 F | DIASTOLIC BLOOD PRESSURE: 76 MMHG | SYSTOLIC BLOOD PRESSURE: 112 MMHG | RESPIRATION RATE: 16 BRPM | WEIGHT: 191 LBS | HEART RATE: 106 BPM

## 2020-01-06 DIAGNOSIS — J01.00 ACUTE MAXILLARY SINUSITIS, RECURRENCE NOT SPECIFIED: Primary | ICD-10-CM

## 2020-01-06 RX ORDER — DIAZEPAM 5 MG/1
TABLET ORAL
COMMUNITY
Start: 2019-12-27 | End: 2021-09-30

## 2020-01-06 RX ORDER — BREXPIPRAZOLE 1 MG/1
1 TABLET ORAL DAILY
COMMUNITY
Start: 2019-12-27 | End: 2021-09-30

## 2020-01-06 RX ORDER — AZITHROMYCIN 250 MG/1
TABLET, FILM COATED ORAL
Qty: 6 TAB | Refills: 0 | Status: SHIPPED | OUTPATIENT
Start: 2020-01-06 | End: 2020-01-06 | Stop reason: SDUPTHER

## 2020-01-06 RX ORDER — AZITHROMYCIN 250 MG/1
TABLET, FILM COATED ORAL
Qty: 6 TAB | Refills: 0 | Status: SHIPPED | OUTPATIENT
Start: 2020-01-06 | End: 2020-01-24 | Stop reason: ALTCHOICE

## 2020-01-06 NOTE — PROGRESS NOTES
Fam Dinero presents today at the clinic for    Chief Complaint   Patient presents with    Cough     non-productive    Nasal Congestion    Fever     low grade    Hoarse        Wt Readings from Last 3 Encounters:   01/06/20 191 lb (86.6 kg)   11/21/18 192 lb (87.1 kg)   10/02/18 192 lb (87.1 kg)     Temp Readings from Last 3 Encounters:   01/06/20 98.9 °F (37.2 °C) (Oral)   11/21/18 98.5 °F (36.9 °C)   10/02/18 99.1 °F (37.3 °C) (Oral)     BP Readings from Last 3 Encounters:   01/06/20 112/76   11/21/18 117/76   10/02/18 119/87     Pulse Readings from Last 3 Encounters:   01/06/20 (!) 106   11/21/18 77   10/02/18 (!) 111       Health Maintenance Due   Topic    DTaP/Tdap/Td series (1 - Tdap)         Learning Assessment:  :     Learning Assessment 10/16/2017   PRIMARY LEARNER Patient   PRIMARY LANGUAGE ENGLISH   LEARNER PREFERENCE PRIMARY READING   ANSWERED BY self   RELATIONSHIP SELF       Depression Screening:  :     3 most recent PHQ Screens 1/6/2020   Little interest or pleasure in doing things Not at all   Feeling down, depressed, irritable, or hopeless Not at all   Total Score PHQ 2 0       Fall Risk Assessment:  :     Fall Risk Assessment, last 12 mths 1/6/2020   Able to walk? Yes   Fall in past 12 months? No       Abuse Screening:  :     Abuse Screening Questionnaire 1/6/2020 10/16/2017   Do you ever feel afraid of your partner? N N   Are you in a relationship with someone who physically or mentally threatens you? N N   Is it safe for you to go home? Y Y       Coordination of Care Questionnaire:  :     1. Have you been to the ER, urgent care clinic since your last visit? Hospitalized since your last visit? [address] [facility] [address] [facility] 11/21/19 for shoulder problem. 2. Have you seen or consulted any other health care providers outside of the [address] [place] [address] since your last visit? Include any pap smears or colon screening.  no

## 2020-01-06 NOTE — PROGRESS NOTES
This note will not be viewable in [address] Eveline Connelly is a 30 y.o. male and presents with Cough (non-productive); Nasal Congestion; Fever (low grade); and Hoarse  . Subjective:    Yadira Bush presents today with complaint of cough, nasal congestion, low-grade fever and hoarseness which began last week. He started with coryza and drainage of clear color. He now has sinus pressure and tenderness with drainage of yellowish-green color. He has taken over-the-counter decongestants for this with minimal relief. His cough is dry and nonproductive. He denies any pleuritic chest pain or shortness of breath. He has no wheezing. Review of Systems  Constitutional:   Eyes:   negative for visual disturbance and irritation  ENT:   negative for tinnitus,ear pains. hoarseness  Respiratory:  negative for  hemoptysis, dyspnea,wheezing  CV:   negative for chest pain, palpitations, lower extremity edema  GI:   negative for nausea, vomiting, diarrhea, abdominal pain,melena  Endo:               negative for polyuria,polydipsia,polyphagia,heat intolerance  Genitourinary: negative for frequency, dysuria and hematuria  Integumentary: negative for rash and pruritus  Hematologic:  negative for easy bruising and gum/nose bleeding  Musculoskel: negative for myalgias, arthralgias, back pain, muscle weakness, joint pain  Neurological:  negative for headaches, dizziness, vertigo, memory problems and gait   Behavl/Psych: negative for feelings of anxiety, depression, mood changes    Past Medical History:   Diagnosis Date    ADHD (attention deficit hyperactivity disorder)     Allergic rhinitis 10/16/2017    Bipolar disorder ([facility] [address] 10/16/2017    Insomnia 10/16/2017    Otitis externa of right ear 10/16/2017    Tourette's 10/16/2017     Past Surgical History:   Procedure Laterality Date    HX HEENT      HX TONSIL AND ADENOIDECTOMY       Social History     Socioeconomic History    Marital status: SINGLE     Spouse name: Not on file    Number of children: Not on file    Years of education: Not on file    Highest education level: Not on file   Tobacco Use    Smoking status: Never Smoker    Smokeless tobacco: Never Used   Substance and Sexual Activity    Alcohol use: Yes    Drug use: No     Family History   Problem Relation Age of Onset    Heart Disease Mother     No Known Problems Father      Current Outpatient Medications   Medication Sig Dispense Refill    REXULTI 1 mg tab tablet Take 1 mg by mouth daily.  diazePAM (VALIUM) 5 mg tablet       azithromycin (ZITHROMAX) 250 mg tablet Take 2 tablets initially then one tablet daily until completed 6 Tab 0    albuterol (PROVENTIL HFA, VENTOLIN HFA, PROAIR HFA) 90 mcg/actuation inhaler Take 2 Puffs by inhalation every four (4) hours as needed (cough). 1 Inhaler 0    guanFACINE ER (INTUNIV) 4 mg Tb24 ER tablet Take 1 Tab by mouth daily.  guanfacine (TENEX) 2 mg Tab Take 2 mg by mouth.  benztropine (COGENTIN) 0.5 mg tablet Take 0.5 mg by mouth two (2) times a day. Allergies   Allergen Reactions    Bactrim [Sulfamethoxazole-Trimethoprim] Anaphylaxis     Patient given Bactrim DS this morning for sinus infection.   Sister called this afternoon patient is in ER with severe reaction; Dr. Vishnu Mohamud notified    Sulfa (Sulfonamide Antibiotics) Anaphylaxis    Lorabid [Loracarbef] Hives    Cefdinir Unknown (comments)       Objective:  Visit Vitals  /76 (BP 1 Location: Left arm, BP Patient Position: Sitting)   Pulse (!) 106   Temp 98.9 °F (37.2 °C) (Oral)   Resp 16   Ht 5' 8\" (1.727 m)   Wt 191 lb (86.6 kg)   SpO2 98%   BMI 29.04 kg/m²     Physical Exam:   General appearance - alert, well appearing, and in no distress  Mental status - alert, oriented to person, place, and time  EYE-HENRY, EOMI, fundi normal, corneas normal, no foreign bodies  ENT-maxillary sinuses revealed decreased transillumination light bilaterally with mild tenderness to percussion on the right, tympanic membrane's are opacified and injected bilaterally  Nose -erythematous and boggy bilaterally  Mouth -oropharynx minimally injected without exudate  Neck - supple, no significant adenopathy   Chest - clear to auscultation, no wheezes, rales or rhonchi, symmetric air entry   Heart - normal rate, regular rhythm, normal S1, S2, no murmurs, rubs, clicks or gallops   Abdomen - soft, nontender, nondistended, no masses or organomegaly  Lymph- no adenopathy palpable  Ext-peripheral pulses normal, no pedal edema, no clubbing or cyanosis  Skin-Warm and dry. no hyperpigmentation, vitiligo, or suspicious lesions  Neuro -alert, oriented, normal speech, no focal findings or movement disorder noted  Musculoskeletal- FROM, no bony abnormalities, no point tenderness    No results found for this visit on 01/06/20. All results for lab orders may not have been returned by the time this encountered was closed. Assessment/Plan:       ICD-10-CM ICD-9-CM    1. Acute maxillary sinusitis, recurrence not specified J01.00 461.0        Orders Placed This Encounter    REXULTI 1 mg tab tablet     Sig: Take 1 mg by mouth daily.  diazePAM (VALIUM) 5 mg tablet    DISCONTD: azithromycin (ZITHROMAX) 250 mg tablet     Sig: Take 2 tablets initially then one tablet daily until completed     Dispense:  6 Tab     Refill:  0    azithromycin (ZITHROMAX) 250 mg tablet     Sig: Take 2 tablets initially then one tablet daily until completed     Dispense:  6 Tab     Refill:  0       Follow-up and Dispositions    · Return if symptoms worsen or fail to improve. Plan:    Sinusitis complicating an upper respiratory infection. Start a azithromycin as prescribed. Follow-up if not improved. I have reviewed with the patient details of the assessment and plan and all questions were answered. Relevent patient education was performed.  Verbal and/or written instructions (see AVS) provided. The most recent lab findings were reviewed with the patient. Plan was discussed with patient who verbal expressed understanding. An After Visit Summary was printed and given to the patient.       Xavier Junior MD

## 2020-01-10 ENCOUNTER — TELEPHONE (OUTPATIENT)
Dept: INTERNAL MEDICINE CLINIC | Age: 30
End: 2020-01-10

## 2020-01-10 NOTE — TELEPHONE ENCOUNTER
Patient was seen on 1/6/20 for cough,fever,congestion. He is almost finished his antibiotic and he is no better. Still running a low grade fever as of last night and coughing.   Please advise    Pharmacy:  [facility]

## 2020-01-14 NOTE — TELEPHONE ENCOUNTER
Pt stated cough was much better, fevers were better but sometimes in afternoon climbing to 99.5. I recommended to Pt to give it acouple of days and if he is still having slight fevers in after to call and schedule an appointment.

## 2020-01-24 ENCOUNTER — OFFICE VISIT (OUTPATIENT)
Dept: INTERNAL MEDICINE CLINIC | Age: 30
End: 2020-01-24

## 2020-01-24 VITALS
RESPIRATION RATE: 16 BRPM | OXYGEN SATURATION: 96 % | TEMPERATURE: 98.8 F | HEART RATE: 102 BPM | BODY MASS INDEX: 29.16 KG/M2 | DIASTOLIC BLOOD PRESSURE: 71 MMHG | HEIGHT: 68 IN | SYSTOLIC BLOOD PRESSURE: 112 MMHG | WEIGHT: 192.4 LBS

## 2020-01-24 DIAGNOSIS — J20.9 ACUTE BRONCHITIS, UNSPECIFIED ORGANISM: Primary | ICD-10-CM

## 2020-01-24 RX ORDER — ALBUTEROL SULFATE 90 UG/1
2 AEROSOL, METERED RESPIRATORY (INHALATION)
Qty: 1 INHALER | Refills: 0 | Status: SHIPPED | OUTPATIENT
Start: 2020-01-24 | End: 2022-10-11

## 2020-01-24 RX ORDER — LEVOFLOXACIN 500 MG/1
500 TABLET, FILM COATED ORAL DAILY
Qty: 7 TAB | Refills: 0 | Status: SHIPPED | OUTPATIENT
Start: 2020-01-24 | End: 2020-01-31

## 2020-01-24 NOTE — PROGRESS NOTES
Reviewed record in preparation for visit and have obtained necessary documentation. Identified pt with two pt identifiers(name and ). Chief Complaint   Patient presents with    Cough        Coordination of Care Questionnaire:  :     1) Have you been to an emergency room, urgent care clinic since your last visit? No     Hospitalized since your last visit? No               2) Have you seen or consulted any other health care providers outside of [address] [place] [address] since your last visit?  No

## 2020-01-24 NOTE — PROGRESS NOTES
This note will not be viewable in [address] is a 30 y.o. male and presents with Cough  . Subjective:    Mr. Sonal Macedo presents today with complaint of continued cough. He was seen a couple of weeks ago and placed on azithromycin initially with good results. However he developed a low-grade temp on a daily basis for several days. He states his temp would get up to around 99 during the course of the day. He started to feel better but now has developed worsening cough productive of scant amount of sputum. He was prescribed an inhaler to use as needed. He denies any pleuritic chest pain or shortness of breath at this time. Review of Systems  Constitutional:   Eyes:   negative for visual disturbance and irritation  ENT:   negative for tinnitus,sore throat,nasal congestion,ear pains. hoarseness  Respiratory:  negative for  hemoptysis, dyspnea,wheezing  CV:   negative for chest pain, palpitations, lower extremity edema  GI:   negative for nausea, vomiting, diarrhea, abdominal pain,melena  Endo:               negative for polyuria,polydipsia,polyphagia,heat intolerance  Genitourinary: negative for frequency, dysuria and hematuria  Integumentary: negative for rash and pruritus  Hematologic:  negative for easy bruising and gum/nose bleeding  Musculoskel: negative for myalgias, arthralgias, back pain, muscle weakness, joint pain  Neurological:  negative for headaches, dizziness, vertigo, memory problems and gait   Behavl/Psych: negative for feelings of anxiety, depression, mood changes    Past Medical History:   Diagnosis Date    ADHD (attention deficit hyperactivity disorder)     Allergic rhinitis 10/16/2017    Bipolar disorder ([facility] [address] 10/16/2017    Insomnia 10/16/2017    Otitis externa of right ear 10/16/2017    Tourette's 10/16/2017     Past Surgical History:   Procedure Laterality Date    HX HEENT      HX TONSIL AND ADENOIDECTOMY       Social History     Socioeconomic History    Marital status: SINGLE     Spouse name: Not on file    Number of children: Not on file    Years of education: Not on file    Highest education level: Not on file   Tobacco Use    Smoking status: Never Smoker    Smokeless tobacco: Never Used   Substance and Sexual Activity    Alcohol use: Yes    Drug use: No     Family History   Problem Relation Age of Onset    Heart Disease Mother     No Known Problems Father      Current Outpatient Medications   Medication Sig Dispense Refill    levoFLOXacin (LEVAQUIN) 500 mg tablet Take 1 Tab by mouth daily for 7 days. 7 Tab 0    albuterol (PROVENTIL HFA, VENTOLIN HFA, PROAIR HFA) 90 mcg/actuation inhaler Take 2 Puffs by inhalation every four (4) hours as needed (cough). 1 Inhaler 0    REXULTI 1 mg tab tablet Take 1 mg by mouth daily.  diazePAM (VALIUM) 5 mg tablet       guanFACINE ER (INTUNIV) 4 mg Tb24 ER tablet Take 1 Tab by mouth daily.  guanfacine (TENEX) 2 mg Tab Take 2 mg by mouth.  benztropine (COGENTIN) 0.5 mg tablet Take 0.5 mg by mouth two (2) times a day. Allergies   Allergen Reactions    Bactrim [Sulfamethoxazole-Trimethoprim] Anaphylaxis     Patient given Bactrim DS this morning for sinus infection.   Sister called this afternoon patient is in ER with severe reaction; Dr. Nataliya Deluca notified    Sulfa (Sulfonamide Antibiotics) Anaphylaxis    Lorabid [Loracarbef] Hives    Cefdinir Unknown (comments)       Objective:  Visit Vitals  /71 (BP 1 Location: Left arm, BP Patient Position: Sitting)   Pulse (!) 102   Temp 98.8 °F (37.1 °C) (Oral)   Resp 16   Ht 5' 8\" (1.727 m)   Wt 192 lb 6.4 oz (87.3 kg)   SpO2 96%   BMI 29.25 kg/m²     Physical Exam:   General appearance - alert, well appearing, and in no distress  Mental status - alert, oriented to person, place, and time  EYE-HENRY, EOMI, fundi normal, corneas normal, no foreign bodies  ENT-ENT exam normal, no neck nodes or sinus tenderness  Nose -nares are erythematous and boggy bilaterally  Mouth - mucous membranes moist, pharynx normal without lesions  Neck - supple, no significant adenopathy   Chest - clear to auscultation, no wheezes, rales or rhonchi, symmetric air entry   Heart - normal rate, regular rhythm, normal S1, S2, no murmurs, rubs, clicks or gallops   Abdomen - soft, nontender, nondistended, no masses or organomegaly  Lymph- no adenopathy palpable  Ext-peripheral pulses normal, no pedal edema, no clubbing or cyanosis  Skin-Warm and dry. no hyperpigmentation, vitiligo, or suspicious lesions  Neuro -alert, oriented, normal speech, no focal findings or movement disorder noted  Musculoskeletal- FROM, no bony abnormalities, no point tenderness    No results found for this visit on 01/24/20. All results for lab orders may not have been returned by the time this encountered was closed. Assessment/Plan:       ICD-10-CM ICD-9-CM    1. Acute bronchitis, unspecified organism J20.9 466.0        Orders Placed This Encounter    levoFLOXacin (LEVAQUIN) 500 mg tablet     Sig: Take 1 Tab by mouth daily for 7 days. Dispense:  7 Tab     Refill:  0    albuterol (PROVENTIL HFA, VENTOLIN HFA, PROAIR HFA) 90 mcg/actuation inhaler     Sig: Take 2 Puffs by inhalation every four (4) hours as needed (cough). Dispense:  1 Inhaler     Refill:  0       Plan:    I suspect the patient may have had a viral upper respiratory infection and or may have an atypical bacterial bronchitis. He will be placed on Levaquin for better coverage of atypical organisms and completed course of therapy. He may continue his albuterol inhaler as needed. If his symptoms persist or progress return to clinic for further evaluation. I have reviewed with the patient details of the assessment and plan and all questions were answered. Relevent patient education was performed. Verbal and/or written instructions (see AVS) provided. The most recent lab findings were reviewed with the patient.   Plan was discussed with patient who verbal expressed understanding. An After Visit Summary was printed and given to the patient.       Darren Wells MD

## 2021-01-11 ENCOUNTER — TELEPHONE (OUTPATIENT)
Dept: INTERNAL MEDICINE CLINIC | Age: 31
End: 2021-01-11

## 2021-09-30 ENCOUNTER — OFFICE VISIT (OUTPATIENT)
Dept: INTERNAL MEDICINE CLINIC | Age: 31
End: 2021-09-30
Payer: COMMERCIAL

## 2021-09-30 VITALS
SYSTOLIC BLOOD PRESSURE: 110 MMHG | RESPIRATION RATE: 18 BRPM | HEIGHT: 68 IN | OXYGEN SATURATION: 96 % | WEIGHT: 217 LBS | BODY MASS INDEX: 32.89 KG/M2 | HEART RATE: 82 BPM | DIASTOLIC BLOOD PRESSURE: 77 MMHG

## 2021-09-30 DIAGNOSIS — J45.41 MODERATE PERSISTENT ASTHMA WITH ACUTE EXACERBATION: Primary | ICD-10-CM

## 2021-09-30 PROCEDURE — 99213 OFFICE O/P EST LOW 20 MIN: CPT | Performed by: INTERNAL MEDICINE

## 2021-09-30 RX ORDER — CETIRIZINE HCL 10 MG
10 TABLET ORAL
Qty: 30 TABLET | Refills: 2 | Status: SHIPPED | OUTPATIENT
Start: 2021-09-30 | End: 2022-10-11

## 2021-09-30 RX ORDER — SERTRALINE HYDROCHLORIDE 100 MG/1
100 TABLET, FILM COATED ORAL DAILY
COMMUNITY

## 2021-09-30 RX ORDER — TOPIRAMATE 50 MG/1
TABLET, FILM COATED ORAL 2 TIMES DAILY
COMMUNITY

## 2021-09-30 RX ORDER — PREDNISONE 10 MG/1
10 TABLET ORAL SEE ADMIN INSTRUCTIONS
Qty: 21 TABLET | Refills: 0 | Status: SHIPPED | OUTPATIENT
Start: 2021-09-30 | End: 2021-12-28 | Stop reason: SDUPTHER

## 2021-09-30 RX ORDER — LAMOTRIGINE 100 MG/1
100 TABLET ORAL DAILY
COMMUNITY
Start: 2021-08-09

## 2021-09-30 NOTE — PROGRESS NOTES
Rashaad Chris is a 33 y.o. male and presents with Cough  . Subjective:  Zeo Olguin presents today with complaint of cough. He states the cough has been present for the past couple of weeks. He denies any fever, shortness of breath, wheezing, or pleuritic chest pain. His cough is productive of whitish sputum. The cough can be worse sometimes at night when lying down. He notes that every year he tends to get this in the fall. Usually occurs with change in the weather. He denies any significant sinus congestion or drainage at this time. Past Medical History:   Diagnosis Date    ADHD (attention deficit hyperactivity disorder)     Allergic rhinitis 10/16/2017    Bipolar disorder ([address] 10/16/2017    Insomnia 10/16/2017    Otitis externa of right ear 10/16/2017    Tourette's 10/16/2017     Past Surgical History:   Procedure Laterality Date    HX HEENT      HX TONSIL AND ADENOIDECTOMY       Allergies   Allergen Reactions    Bactrim [Sulfamethoxazole-Trimethoprim] Anaphylaxis     Patient given Bactrim DS this morning for sinus infection. Sister called this afternoon patient is in ER with severe reaction; Dr. China Shoemaker notified    Sulfa (Sulfonamide Antibiotics) Anaphylaxis    Lorabid [Loracarbef] Hives    Cefdinir Unknown (comments)     Current Outpatient Medications   Medication Sig Dispense Refill    lamoTRIgine (LaMICtal) 100 mg tablet       pimozide (ORAP PO) Take  by mouth.  topiramate (TOPAMAX) 50 mg tablet Take  by mouth two (2) times a day.  sertraline (Zoloft) 50 mg tablet Take  by mouth daily.  predniSONE (STERAPRED DS) 10 mg dose pack Take 1 Tablet by mouth See Admin Instructions. See administration instruction per 10mg dose pack 21 Tablet 0    cetirizine (ZYRTEC) 10 mg tablet Take 1 Tablet by mouth daily as needed for Allergies (Cough).  30 Tablet 2    albuterol (PROVENTIL HFA, VENTOLIN HFA, PROAIR HFA) 90 mcg/actuation inhaler Take 2 Puffs by inhalation every four (4) hours as needed (cough). 1 Inhaler 0     Social History     Socioeconomic History    Marital status: SINGLE     Spouse name: Not on file    Number of children: Not on file    Years of education: Not on file    Highest education level: Not on file   Tobacco Use    Smoking status: Never Smoker    Smokeless tobacco: Never Used   Substance and Sexual Activity    Alcohol use: Yes    Drug use: No     Social Determinants of Health     Financial Resource Strain:     Difficulty of Paying Living Expenses:    Food Insecurity:     Worried About Running Out of Food in the Last Year:     [address] in the Last Year:    Transportation Needs:     Lack of Transportation (Medical):  Lack of Transportation (Non-Medical):    Physical Activity:     Days of Exercise per Week:     Minutes of Exercise per Session:    Stress:     Feeling of Stress :    Social Connections:     Frequency of Communication with Friends and Family:     Frequency of Social Gatherings with Friends and Family:     Attends Denominational Services:     Active Member of Clubs or Organizations:     Attends Club or Organization Meetings:     Marital Status:      Family History   Problem Relation Age of Onset    Heart Disease Mother     No Known Problems Father        Review of Systems  Constitutional:  negative for fevers, chills, anorexia and weight loss  Eyes:    negative for visual disturbance and irritation  ENT:    negative for tinnitus,sore throat,nasal congestion,ear pains. hoarseness  Respiratory:     negative for  hemoptysis, dyspnea,wheezing  CV:    negative for chest pain, palpitations, lower extremity edema  GI:    negative for nausea, vomiting, diarrhea, abdominal pain,melena  Endo:               negative for polyuria,polydipsia,polyphagia,heat intolerance  Genitourinary : negative for frequency, dysuria and hematuria  Integumentary: negative for rash and pruritus  Hematologic:   negative for easy bruising and gum/nose bleeding  Musculoskel:  negative for myalgias, arthralgias, back pain, muscle weakness, joint pain  Neurological:   negative for headaches, dizziness, vertigo, memory problems and gait   Behavl/Psych:  negative for feelings of anxiety, depression, mood changes  ROS otherwise negative      Objective:  Visit Vitals  /77 (BP 1 Location: Left arm, BP Patient Position: Sitting, BP Cuff Size: Large adult)   Pulse 82   Resp 18   Ht 5' 8\" (1.727 m)   Wt 217 lb (98.4 kg)   SpO2 96%   BMI 32.99 kg/m²     Physical Exam:   General appearance - alert, well appearing, and in no distress  Mental status - alert, oriented to person, place, and time  EYE-HENRY, EOMI, fundi normal, corneas normal, no foreign bodies  ENT-ENT exam normal, no neck nodes or sinus tenderness  Nose - normal and patent, no erythema, discharge or polyps  Mouth - mucous membranes moist, pharynx normal without lesions  Neck - supple, no significant adenopathy   Chest - clear to auscultation, no wheezes, rales or rhonchi, symmetric air entry   Heart - normal rate, regular rhythm, normal S1, S2, no murmurs, rubs, clicks or gallops   Abdomen - soft, nontender, nondistended, no masses or organomegaly  Lymph- no adenopathy palpable  Ext-peripheral pulses normal, no pedal edema, no clubbing or cyanosis  Skin-Warm and dry. no hyperpigmentation, vitiligo, or suspicious lesions  Neuro -alert, oriented, normal speech, no focal findings or movement disorder noted      Assessment/Plan:  Diagnoses and all orders for this visit:    1. Moderate persistent asthma with acute exacerbation  -     predniSONE (STERAPRED DS) 10 mg dose pack; Take 1 Tablet by mouth See Admin Instructions. See administration instruction per 10mg dose pack  -     cetirizine (ZYRTEC) 10 mg tablet; Take 1 Tablet by mouth daily as needed for Allergies (Cough). ICD-10-CM ICD-9-CM    1.  Moderate persistent asthma with acute exacerbation  J45.41 493.92 predniSONE (STERAPRED DS) 10 mg dose pack cetirizine (ZYRTEC) 10 mg tablet       Plan:    Patient likely has mild reactive airway disease or asthma with seasonal allergies. He will be placed on Zyrtec 10 mg daily as needed and a steroid Dosepak. He will continue his albuterol inhaler on an as-needed basis. If his cough does not improve may consider addition of Singulair and a course of antibiotics if indicated. Keep in mind his allergies and medications if an antibiotic is prescribed. I have reviewed with the patient details of the assessment and plan and all questions were answered. Relevent patient education was performed. Verbal and/or written instructions (see AVS) provided. The most recent lab findings were reviewed with the patient. Plan was discussed with patient who verbally expressed understanding. An After Visit Summary was printed and given to the patient.     Rosemary Severino MD

## 2021-12-03 ENCOUNTER — OFFICE VISIT (OUTPATIENT)
Dept: INTERNAL MEDICINE CLINIC | Age: 31
End: 2021-12-03
Payer: COMMERCIAL

## 2021-12-03 VITALS
DIASTOLIC BLOOD PRESSURE: 78 MMHG | OXYGEN SATURATION: 97 % | HEART RATE: 85 BPM | BODY MASS INDEX: 33.04 KG/M2 | WEIGHT: 218 LBS | HEIGHT: 68 IN | SYSTOLIC BLOOD PRESSURE: 111 MMHG | RESPIRATION RATE: 18 BRPM

## 2021-12-03 DIAGNOSIS — J02.9 ACUTE PHARYNGITIS, UNSPECIFIED ETIOLOGY: Primary | ICD-10-CM

## 2021-12-03 PROCEDURE — 99213 OFFICE O/P EST LOW 20 MIN: CPT | Performed by: INTERNAL MEDICINE

## 2021-12-03 RX ORDER — DOXYCYCLINE 100 MG/1
100 CAPSULE ORAL 2 TIMES DAILY
Qty: 20 CAPSULE | Refills: 0 | Status: SHIPPED | OUTPATIENT
Start: 2021-12-03 | End: 2021-12-28 | Stop reason: SDUPTHER

## 2021-12-03 RX ORDER — BENZONATATE 200 MG/1
200 CAPSULE ORAL
Qty: 21 CAPSULE | Refills: 0 | Status: SHIPPED | OUTPATIENT
Start: 2021-12-03 | End: 2021-12-10

## 2021-12-03 NOTE — PROGRESS NOTES
Marii Marvin is a 33 y.o. male and presents with Cough  . Subjective:  Mr. Lurdes Gibson presents today with complaint of a cough. He states the cough has been violent and severe. His symptoms began on Monday. His cough has been largely nonproductive. He has no shortness of breath. He has been using an albuterol inhaler on an as-needed basis. He has a history of asthmatic bronchitis. His drug allergies include Bactrim and sulfa drugs. He also had a potential problem with cephalosporins in the past.  He is on Orap and cannot take a azithromycin. He has not had a fever. He has not lost sense of taste or smell. He has no pleuritic chest pain. He states that his coughing was so severe that he nearly passed out. Past Medical History:   Diagnosis Date    ADHD (attention deficit hyperactivity disorder)     Allergic rhinitis 10/16/2017    Bipolar disorder ([facility] [address] 10/16/2017    Insomnia 10/16/2017    Otitis externa of right ear 10/16/2017    Tourette's 10/16/2017     Past Surgical History:   Procedure Laterality Date    HX HEENT      HX TONSIL AND ADENOIDECTOMY       Allergies   Allergen Reactions    Bactrim [Sulfamethoxazole-Trimethoprim] Anaphylaxis     Patient given Bactrim DS this morning for sinus infection. Sister called this afternoon patient is in ER with severe reaction; Dr. Cayetano Decker notified    Sulfa (Sulfonamide Antibiotics) Anaphylaxis    Lorabid [Loracarbef] Hives    Cefdinir Unknown (comments)     Current Outpatient Medications   Medication Sig Dispense Refill    benzonatate (TESSALON) 200 mg capsule Take 1 Capsule by mouth three (3) times daily as needed for Cough for up to 7 days. 21 Capsule 0    doxycycline (VIBRAMYCIN) 100 mg capsule Take 1 Capsule by mouth two (2) times a day for 10 days. 20 Capsule 0    lamoTRIgine (LaMICtal) 100 mg tablet       pimozide (ORAP PO) Take  by mouth.  topiramate (TOPAMAX) 50 mg tablet Take  by mouth two (2) times a day.       sertraline (Zoloft) 50 mg tablet Take  by mouth daily.  predniSONE (STERAPRED DS) 10 mg dose pack Take 1 Tablet by mouth See Admin Instructions. See administration instruction per 10mg dose pack (Patient not taking: Reported on 12/3/2021) 21 Tablet 0    cetirizine (ZYRTEC) 10 mg tablet Take 1 Tablet by mouth daily as needed for Allergies (Cough). (Patient not taking: Reported on 12/3/2021) 30 Tablet 2    albuterol (PROVENTIL HFA, VENTOLIN HFA, PROAIR HFA) 90 mcg/actuation inhaler Take 2 Puffs by inhalation every four (4) hours as needed (cough). (Patient not taking: Reported on 12/3/2021) 1 Inhaler 0     Social History     Socioeconomic History    Marital status: SINGLE   Tobacco Use    Smoking status: Never Smoker    Smokeless tobacco: Never Used   Substance and Sexual Activity    Alcohol use: Yes    Drug use: No     Family History   Problem Relation Age of Onset    Heart Disease Mother     No Known Problems Father        Review of Systems  Constitutional:  negative for fevers, chills, anorexia and weight loss  Eyes:    negative for visual disturbance and irritation  ENT:    negative for tinnitus,,nasal congestion,ear pains. hoarseness  Respiratory:     negative for , hemoptysis, dyspnea,wheezing  CV:    negative for chest pain, palpitations, lower extremity edema  GI:    negative for nausea, vomiting, diarrhea, abdominal pain,melena  Endo:               negative for polyuria,polydipsia,polyphagia,heat intolerance  Genitourinary : negative for frequency, dysuria and hematuria  Integumentary: negative for rash and pruritus  Hematologic:   negative for easy bruising and gum/nose bleeding  Musculoskel:  negative for myalgias, arthralgias, back pain, muscle weakness, joint pain  Neurological:   negative for headaches, dizziness, vertigo, memory problems and gait   Behavl/Psych:  negative for feelings of anxiety, depression, mood changes  ROS otherwise negative      Objective:  Visit Vitals  /78 (BP 1 Location: Left arm, BP Patient Position: Sitting, BP Cuff Size: Large adult)   Pulse 85   Resp 18   Ht 5' 8\" (1.727 m)   Wt 218 lb (98.9 kg)   SpO2 97%   BMI 33.15 kg/m²     Physical Exam:   General appearance - alert, well appearing, and in no distress  Mental status - alert, oriented to person, place, and time  EYE-HENRY, EOMI, fundi normal, corneas normal, no foreign bodies  ENT-ENT exam normal, no neck nodes or sinus tenderness  Nose -erythematous and boggy  Mouth -oropharynx injected and swollen  Neck - supple, no significant adenopathy   Chest - clear to auscultation, no wheezes, rales or rhonchi, symmetric air entry   Heart - normal rate, regular rhythm, normal S1, S2, no murmurs, rubs, clicks or gallops   Abdomen - soft, nontender, nondistended, no masses or organomegaly  Lymph- no adenopathy palpable  Ext-peripheral pulses normal, no pedal edema, no clubbing or cyanosis  Skin-Warm and dry. no hyperpigmentation, vitiligo, or suspicious lesions  Neuro -alert, oriented, normal speech, no focal findings or movement disorder noted      Assessment/Plan:  Diagnoses and all orders for this visit:    1. Acute pharyngitis, unspecified etiology    Other orders  -     benzonatate (TESSALON) 200 mg capsule; Take 1 Capsule by mouth three (3) times daily as needed for Cough for up to 7 days. -     doxycycline (VIBRAMYCIN) 100 mg capsule; Take 1 Capsule by mouth two (2) times a day for 10 days. ICD-10-CM ICD-9-CM    1. Acute pharyngitis, unspecified etiology  J02.9 462      Plan:    The patient will be placed empirically on doxycycline and Tessalon Perles. He should remain out of work this weekend but may return to work as early as Monday, 6 December. He will call if he develops any untoward symptoms or has any issues with his medication. (He was originally told that he would be given Levaquin although there is potential interaction with or wrap and so this was changed to doxycycline.   The pharmacy was given a note to this effect as well. He will call if he needs a note for work. I have reviewed with the patient details of the assessment and plan and all questions were answered. Relevent patient education was performed. Verbal and/or written instructions (see AVS) provided. The most recent lab findings were reviewed with the patient. Plan was discussed with patient who verbally expressed understanding. An After Visit Summary was printed and given to the patient.     Jam Loera MD

## 2021-12-03 NOTE — PROGRESS NOTES
1. Have you been to the ER, urgent care clinic since your last visit? Hospitalized since your last visit? No    2. Have you seen or consulted any other health care providers outside of the [address] [place] [address] since your last visit? Include any pap smears or colon screening.  No

## 2021-12-27 ENCOUNTER — TELEPHONE (OUTPATIENT)
Dept: INTERNAL MEDICINE CLINIC | Age: 31
End: 2021-12-27

## 2021-12-27 NOTE — TELEPHONE ENCOUNTER
Patient's mother called in. Suzan Dean came in about 3 weeks ago and was given antibiotics for his symptoms. Mother States his symptoms never completely went away.      [phone]

## 2021-12-28 ENCOUNTER — OFFICE VISIT (OUTPATIENT)
Dept: INTERNAL MEDICINE CLINIC | Age: 31
End: 2021-12-28
Payer: COMMERCIAL

## 2021-12-28 VITALS
SYSTOLIC BLOOD PRESSURE: 105 MMHG | HEART RATE: 76 BPM | DIASTOLIC BLOOD PRESSURE: 72 MMHG | OXYGEN SATURATION: 97 % | HEIGHT: 68 IN | RESPIRATION RATE: 16 BRPM | WEIGHT: 219 LBS | BODY MASS INDEX: 33.19 KG/M2

## 2021-12-28 DIAGNOSIS — J45.41 MODERATE PERSISTENT ASTHMA WITH ACUTE EXACERBATION: ICD-10-CM

## 2021-12-28 PROCEDURE — 99213 OFFICE O/P EST LOW 20 MIN: CPT | Performed by: INTERNAL MEDICINE

## 2021-12-28 RX ORDER — PREDNISONE 10 MG/1
10 TABLET ORAL SEE ADMIN INSTRUCTIONS
Qty: 21 TABLET | Refills: 0 | Status: SHIPPED | OUTPATIENT
Start: 2021-12-28 | End: 2022-10-11

## 2021-12-28 RX ORDER — DOXYCYCLINE 100 MG/1
100 CAPSULE ORAL 2 TIMES DAILY
Qty: 28 CAPSULE | Refills: 0 | Status: SHIPPED | OUTPATIENT
Start: 2021-12-28 | End: 2022-01-11

## 2021-12-28 NOTE — PROGRESS NOTES
Kei Michel is a 33 y.o. male and presents with Sinus Pain  . Subjective:  Shimon Patiño presents today with recurrent symptoms of sinus pressure congestion and drainage. He had similar symptoms approximately a month ago and did respond to a course of doxycycline and prednisone. However his symptoms have returned. He does note that he has a cat at home that sleeps with him at nighttime which may be a contributing factor. He denies fever. He denies any chest congestion. He does have a nonproductive cough. He has taken some over-the-counter decongestants without any benefit. Past Medical History:   Diagnosis Date    ADHD (attention deficit hyperactivity disorder)     Allergic rhinitis 10/16/2017    Bipolar disorder ([address] 10/16/2017    Insomnia 10/16/2017    Otitis externa of right ear 10/16/2017    Tourette's 10/16/2017     Past Surgical History:   Procedure Laterality Date    HX HEENT      HX TONSIL AND ADENOIDECTOMY       Allergies   Allergen Reactions    Bactrim [Sulfamethoxazole-Trimethoprim] Anaphylaxis     Patient given Bactrim DS this morning for sinus infection. Sister called this afternoon patient is in ER with severe reaction; Dr. No Romano notified    Sulfa (Sulfonamide Antibiotics) Anaphylaxis    Lorabid [Loracarbef] Hives    Cefdinir Unknown (comments)     Current Outpatient Medications   Medication Sig Dispense Refill    predniSONE (STERAPRED DS) 10 mg dose pack Take 1 Tablet by mouth See Admin Instructions. See administration instruction per 10mg dose pack 21 Tablet 0    doxycycline (VIBRAMYCIN) 100 mg capsule Take 1 Capsule by mouth two (2) times a day for 14 days. 28 Capsule 0    lamoTRIgine (LaMICtal) 100 mg tablet       pimozide (ORAP PO) Take  by mouth.  topiramate (TOPAMAX) 50 mg tablet Take  by mouth two (2) times a day.  sertraline (Zoloft) 50 mg tablet Take  by mouth daily.       cetirizine (ZYRTEC) 10 mg tablet Take 1 Tablet by mouth daily as needed for Allergies (Cough). (Patient not taking: Reported on 12/3/2021) 30 Tablet 2    albuterol (PROVENTIL HFA, VENTOLIN HFA, PROAIR HFA) 90 mcg/actuation inhaler Take 2 Puffs by inhalation every four (4) hours as needed (cough). (Patient not taking: Reported on 12/3/2021) 1 Inhaler 0     Social History     Socioeconomic History    Marital status: SINGLE   Tobacco Use    Smoking status: Never Smoker    Smokeless tobacco: Never Used   Substance and Sexual Activity    Alcohol use: Yes    Drug use: No     Family History   Problem Relation Age of Onset    Heart Disease Mother     No Known Problems Father        Review of Systems  Constitutional:  negative for fevers, chills, anorexia and weight loss  Eyes:    negative for visual disturbance and irritation  ENT:    negative for tinnitus,sore throat,nasal congestion,ear pains. hoarseness  Respiratory:     negative for cough, hemoptysis, dyspnea,wheezing  CV:    negative for chest pain, palpitations, lower extremity edema  GI:    negative for nausea, vomiting, diarrhea, abdominal pain,melena  Endo:               negative for polyuria,polydipsia,polyphagia,heat intolerance  Genitourinary : negative for frequency, dysuria and hematuria  Integumentary: negative for rash and pruritus  Hematologic:   negative for easy bruising and gum/nose bleeding  Musculoskel:  negative for myalgias, arthralgias, back pain, muscle weakness, joint pain  Neurological:   negative for headaches, dizziness, vertigo, memory problems and gait   Behavl/Psych:  negative for feelings of anxiety, depression, mood changes  ROS otherwise negative      Objective:  Visit Vitals  /72 (BP 1 Location: Right arm, BP Patient Position: Sitting, BP Cuff Size: Large adult)   Pulse 76   Resp 16   Ht 5' 8\" (1.727 m)   Wt 219 lb (99.3 kg)   SpO2 97%   BMI 33.30 kg/m²     Physical Exam:   General appearance - alert, well appearing, and in no distress  Mental status - alert, oriented to person, place, and time  EYE-HENRY, EOMI, fundi normal, corneas normal, no foreign bodies  ENT-tympanic membrane is opacified bilaterally with mild injection, decrease transillumination light bilaterally of maxillary sinuses  Nose -erythematous and boggy bilaterally  Mouth - mucous membranes moist, pharynx normal without lesions  Neck - supple, no significant adenopathy   Chest - clear to auscultation, no wheezes, rales or rhonchi, symmetric air entry   Heart - normal rate, regular rhythm, normal S1, S2, no murmurs, rubs, clicks or gallops   Abdomen - soft, nontender, nondistended, no masses or organomegaly  Lymph- no adenopathy palpable  Ext-peripheral pulses normal, no pedal edema, no clubbing or cyanosis  Skin-Warm and dry. no hyperpigmentation, vitiligo, or suspicious lesions  Neuro -alert, oriented, normal speech, no focal findings or movement disorder noted      Assessment/Plan:  Diagnoses and all orders for this visit:    1. Moderate persistent asthma with acute exacerbation  -     predniSONE (STERAPRED DS) 10 mg dose pack; Take 1 Tablet by mouth See Admin Instructions. See administration instruction per 10mg dose pack    Other orders  -     doxycycline (VIBRAMYCIN) 100 mg capsule; Take 1 Capsule by mouth two (2) times a day for 14 days. ICD-10-CM ICD-9-CM    1. Moderate persistent asthma with acute exacerbation  J45.41 493.92 predniSONE (STERAPRED DS) 10 mg dose pack       Plan:    Repeat a course of doxycycline and a Sterapred Dosepak. I have recommended he start Nasacort over-the-counter 1 spray each nostril daily. I suspect his recurring sinus infections may be related to allergies as well. If symptoms do not improve may consider addition of Singulair or an antihistamine. Lastly discussed possible referral to ENT or allergist for further evaluation. Avoidance would be problematic at this point. I have reviewed with the patient details of the assessment and plan and all questions were answered.  Relevent patient education was performed. Verbal and/or written instructions (see AVS) provided. The most recent lab findings were reviewed with the patient. Plan was discussed with patient who verbally expressed understanding. An After Visit Summary was printed and given to the patient.     Guerda Darby MD

## 2021-12-28 NOTE — PROGRESS NOTES
1. Have you been to the ER, urgent care clinic since your last visit? Hospitalized since your last visit? No    2. Have you seen or consulted any other health care providers outside of the [address] [place] [address] since your last visit? Include any pap smears or colon screening.  No

## 2022-03-19 PROBLEM — H60.91 OTITIS EXTERNA OF RIGHT EAR: Status: ACTIVE | Noted: 2017-10-16

## 2022-03-19 PROBLEM — J30.9 ALLERGIC RHINITIS: Status: ACTIVE | Noted: 2017-10-16

## 2022-03-19 PROBLEM — F95.2 TOURETTE'S: Status: ACTIVE | Noted: 2017-10-16

## 2022-03-19 PROBLEM — F31.9 BIPOLAR DISORDER (HCC): Status: ACTIVE | Noted: 2017-10-16

## 2022-03-20 PROBLEM — G47.00 INSOMNIA: Status: ACTIVE | Noted: 2017-10-16

## 2022-06-21 ENCOUNTER — OFFICE VISIT (OUTPATIENT)
Dept: INTERNAL MEDICINE CLINIC | Age: 32
End: 2022-06-21
Payer: COMMERCIAL

## 2022-06-21 VITALS
BODY MASS INDEX: 34.04 KG/M2 | RESPIRATION RATE: 20 BRPM | HEART RATE: 57 BPM | SYSTOLIC BLOOD PRESSURE: 122 MMHG | DIASTOLIC BLOOD PRESSURE: 78 MMHG | OXYGEN SATURATION: 98 % | TEMPERATURE: 98.3 F | HEIGHT: 68 IN | WEIGHT: 224.6 LBS

## 2022-06-21 DIAGNOSIS — R10.11 RIGHT UPPER QUADRANT ABDOMINAL PAIN: ICD-10-CM

## 2022-06-21 DIAGNOSIS — K59.1 FUNCTIONAL DIARRHEA: Primary | ICD-10-CM

## 2022-06-21 PROCEDURE — 99213 OFFICE O/P EST LOW 20 MIN: CPT | Performed by: INTERNAL MEDICINE

## 2022-06-21 NOTE — PROGRESS NOTES
To Anthony is a 33 y.o. male presenting for Diarrhea  . 1. Have you been to the ER, urgent care clinic since your last visit? Hospitalized since your last visit? No    2. Have you seen or consulted any other health care providers outside of the [address] [place] [address] since your last visit? Include any pap smears or colon screening. Psychiatrist    Fall Risk Assessment, last 12 mths 1/6/2020   Able to walk? Yes   Fall in past 12 months? No         Abuse Screening Questionnaire 1/6/2020   Do you ever feel afraid of your partner? N   Are you in a relationship with someone who physically or mentally threatens you? N   Is it safe for you to go home? Y       3 most recent PHQ Screens 6/21/2022   Little interest or pleasure in doing things Not at all   Feeling down, depressed, irritable, or hopeless Not at all   Total Score PHQ 2 0       There are no discontinued medications.

## 2022-06-21 NOTE — PROGRESS NOTES
Elizabeth Stack is a 33 y.o. male and presents with Diarrhea  . Subjective:  Mr. Michael Aolnso presents today with complaint of ongoing diarrhea that has been present for years. He states that anytime he eats he will have a loose stool within 5 minutes of eating. This occurs with regularity. Sometimes he will have to go to the bathroom 2 or 3 times. Usually goes 3-4 times a day. He has had no weight loss. He is on multiple medications. He denies any crampy abdominal pain. He denies any melena or hematochezia. He has had no fever. He has had no nausea or vomiting. Past Medical History:   Diagnosis Date    ADHD (attention deficit hyperactivity disorder)     Allergic rhinitis 10/16/2017    Bipolar disorder ([facility] [address] 10/16/2017    Insomnia 10/16/2017    Otitis externa of right ear 10/16/2017    Tourette's 10/16/2017     Past Surgical History:   Procedure Laterality Date    HX HEENT      HX TONSIL AND ADENOIDECTOMY       Allergies   Allergen Reactions    Bactrim [Sulfamethoxazole-Trimethoprim] Anaphylaxis     Patient given Bactrim DS this morning for sinus infection. Sister called this afternoon patient is in ER with severe reaction; Dr. Madalyn Valiente notified    Sulfa (Sulfonamide Antibiotics) Anaphylaxis    Lorabid [Loracarbef] Hives    Cefdinir Unknown (comments)     Current Outpatient Medications   Medication Sig Dispense Refill    lamoTRIgine (LaMICtal) 100 mg tablet Take 100 mg by mouth daily.  pimozide (ORAP PO) Take  by mouth daily.  topiramate (TOPAMAX) 50 mg tablet Take  by mouth two (2) times a day.  sertraline (Zoloft) 100 mg tablet Take 100 mg by mouth daily.  predniSONE (STERAPRED DS) 10 mg dose pack Take 1 Tablet by mouth See Admin Instructions. See administration instruction per 10mg dose pack (Patient not taking: Reported on 6/21/2022) 21 Tablet 0    cetirizine (ZYRTEC) 10 mg tablet Take 1 Tablet by mouth daily as needed for Allergies (Cough).  (Patient not taking: Reported on 12/3/2021) 30 Tablet 2    albuterol (PROVENTIL HFA, VENTOLIN HFA, PROAIR HFA) 90 mcg/actuation inhaler Take 2 Puffs by inhalation every four (4) hours as needed (cough). (Patient not taking: Reported on 6/21/2022) 1 Inhaler 0     Social History     Socioeconomic History    Marital status: SINGLE   Tobacco Use    Smoking status: Never Smoker    Smokeless tobacco: Never Used   Vaping Use    Vaping Use: Never used   Substance and Sexual Activity    Alcohol use: Yes    Drug use: No     Family History   Problem Relation Age of Onset    Heart Disease Mother     No Known Problems Father        Review of Systems  Constitutional:  negative for fevers, chills, anorexia and weight loss  Eyes:    negative for visual disturbance and irritation  ENT:    negative for tinnitus,sore throat,nasal congestion,ear pains. hoarseness  Respiratory:     negative for cough, hemoptysis, dyspnea,wheezing  CV:    negative for chest pain, palpitations, lower extremity edema  GI:    negative for nausea, vomiting,  abdominal pain,melena  Endo:               negative for polyuria,polydipsia,polyphagia,heat intolerance  Genitourinary : negative for frequency, dysuria and hematuria  Integumentary: negative for rash and pruritus  Hematologic:   negative for easy bruising and gum/nose bleeding  Musculoskel:  negative for myalgias, arthralgias, back pain, muscle weakness, joint pain  Neurological:   negative for headaches, dizziness, vertigo, memory problems and gait   Behavl/Psych:  negative for feelings of anxiety, depression, mood changes  ROS otherwise negative      Objective:  Visit Vitals  /78 (BP 1 Location: Left upper arm, BP Patient Position: Sitting, BP Cuff Size: Adult)   Pulse (!) 57   Temp 98.3 °F (36.8 °C) (Oral)   Resp 20   Ht 5' 8\" (1.727 m)   Wt 224 lb 9.6 oz (101.9 kg)   SpO2 98%   BMI 34.15 kg/m²     Physical Exam:   General appearance - alert, well appearing, and in no distress  Mental status - alert, oriented to person, place, and time  EYE-HENRY, EOMI, fundi normal, corneas normal, no foreign bodies  ENT-ENT exam normal, no neck nodes or sinus tenderness  Nose - normal and patent, no erythema, discharge or polyps  Mouth - mucous membranes moist, pharynx normal without lesions  Neck - supple, no significant adenopathy   Chest - clear to auscultation, no wheezes, rales or rhonchi, symmetric air entry   Heart - normal rate, regular rhythm, normal S1, S2, no murmurs, rubs, clicks or gallops   Abdomen - soft, tender right upper quadrant, mildly distended, no masses or organomegaly  Lymph- no adenopathy palpable  Ext-peripheral pulses normal, no pedal edema, no clubbing or cyanosis  Skin-Warm and dry. no hyperpigmentation, vitiligo, or suspicious lesions  Neuro -alert, oriented, normal speech, no focal findings or movement disorder noted      Assessment/Plan:  Diagnoses and all orders for this visit:    1. Functional diarrhea  -     CBC WITH AUTOMATED DIFF; Future  -     METABOLIC PANEL, COMPREHENSIVE; Future  -     TSH 3RD GENERATION; Future  -     REFERRAL TO GASTROENTEROLOGY  -     US ABD COMP; Future    2. Right upper quadrant abdominal pain  -     US ABD COMP; Future          ICD-10-CM ICD-9-CM    1. Functional diarrhea  K59.1 564.5 CBC WITH AUTOMATED DIFF      METABOLIC PANEL, COMPREHENSIVE      TSH 3RD GENERATION      REFERRAL TO GASTROENTEROLOGY      US ABD COMP      TSH 3RD GENERATION      METABOLIC PANEL, COMPREHENSIVE      CBC WITH AUTOMATED DIFF   2. Right upper quadrant abdominal pain  R10.11 789.01 US ABD COMP     Plan:    The patient appears to have had this diarrhea longstanding. This is not an acute event. It does not appear to be related to foods as it occurs every time he eats. He is on medications that may be a contributing factor. He will have labs done to rule out any obvious metabolic cause.   I would consider referral to GI for evaluation to rule out other causes such as collagenous colitis, inflammatory bowel disease or other etiology. He does have some right upper quadrant tenderness on exam and may benefit from an abdominal ultrasound. Follow-up and Dispositions    · Return in about 3 months (around 9/21/2022) for follow up. I have reviewed with the patient details of the assessment and plan and all questions were answered. Relevent patient education was performed. Verbal and/or written instructions (see AVS) provided. The most recent lab findings were reviewed with the patient. Plan was discussed with patient who verbally expressed understanding. An After Visit Summary was printed and given to the patient.     Sampson Duong MD

## 2022-06-22 LAB
ALBUMIN SERPL-MCNC: 4.9 G/DL (ref 4–5)
ALBUMIN/GLOB SERPL: 2.5 {RATIO} (ref 1.2–2.2)
ALP SERPL-CCNC: 133 IU/L (ref 44–121)
ALT SERPL-CCNC: 23 IU/L (ref 0–44)
AST SERPL-CCNC: 21 IU/L (ref 0–40)
BASOPHILS # BLD AUTO: 0.1 X10E3/UL (ref 0–0.2)
BASOPHILS NFR BLD AUTO: 1 %
BILIRUB SERPL-MCNC: 0.5 MG/DL (ref 0–1.2)
BUN SERPL-MCNC: 13 MG/DL (ref 6–20)
BUN/CREAT SERPL: 10 (ref 9–20)
CALCIUM SERPL-MCNC: 9.8 MG/DL (ref 8.7–10.2)
CHLORIDE SERPL-SCNC: 107 MMOL/L (ref 96–106)
CO2 SERPL-SCNC: 19 MMOL/L (ref 20–29)
CREAT SERPL-MCNC: 1.25 MG/DL (ref 0.76–1.27)
EGFR: 78 ML/MIN/1.73
EOSINOPHIL # BLD AUTO: 0.2 X10E3/UL (ref 0–0.4)
EOSINOPHIL NFR BLD AUTO: 2 %
ERYTHROCYTE [DISTWIDTH] IN BLOOD BY AUTOMATED COUNT: 12.2 % (ref 11.6–15.4)
GLOBULIN SER CALC-MCNC: 2 G/DL (ref 1.5–4.5)
GLUCOSE SERPL-MCNC: 91 MG/DL (ref 65–99)
HCT VFR BLD AUTO: 49.1 % (ref 37.5–51)
HGB BLD-MCNC: 16.7 G/DL (ref 13–17.7)
IMM GRANULOCYTES # BLD AUTO: 0 X10E3/UL (ref 0–0.1)
IMM GRANULOCYTES NFR BLD AUTO: 0 %
LYMPHOCYTES # BLD AUTO: 2.1 X10E3/UL (ref 0.7–3.1)
LYMPHOCYTES NFR BLD AUTO: 33 %
MCH RBC QN AUTO: 30.8 PG (ref 26.6–33)
MCHC RBC AUTO-ENTMCNC: 34 G/DL (ref 31.5–35.7)
MCV RBC AUTO: 90 FL (ref 79–97)
MONOCYTES # BLD AUTO: 0.5 X10E3/UL (ref 0.1–0.9)
MONOCYTES NFR BLD AUTO: 7 %
NEUTROPHILS # BLD AUTO: 3.4 X10E3/UL (ref 1.4–7)
NEUTROPHILS NFR BLD AUTO: 57 %
PLATELET # BLD AUTO: 384 X10E3/UL (ref 150–450)
POTASSIUM SERPL-SCNC: 4.3 MMOL/L (ref 3.5–5.2)
PROT SERPL-MCNC: 6.9 G/DL (ref 6–8.5)
RBC # BLD AUTO: 5.43 X10E6/UL (ref 4.14–5.8)
SODIUM SERPL-SCNC: 142 MMOL/L (ref 134–144)
TSH SERPL DL<=0.005 MIU/L-ACNC: 1.42 UIU/ML (ref 0.45–4.5)
WBC # BLD AUTO: 6.2 X10E3/UL (ref 3.4–10.8)

## 2022-06-27 NOTE — PROGRESS NOTES
Thyroid test is normal.  Glucose normal.  Liver and kidney function are normal.  Complete blood count normal.  Abdominal ultrasound pending.

## 2022-06-28 ENCOUNTER — HOSPITAL ENCOUNTER (OUTPATIENT)
Dept: ULTRASOUND IMAGING | Age: 32
Discharge: HOME | End: 2022-06-28
Attending: INTERNAL MEDICINE
Payer: COMMERCIAL

## 2022-06-28 DIAGNOSIS — K59.1 FUNCTIONAL DIARRHEA: ICD-10-CM

## 2022-06-28 DIAGNOSIS — R10.11 RIGHT UPPER QUADRANT ABDOMINAL PAIN: ICD-10-CM

## 2022-06-28 PROCEDURE — 76700 US EXAM ABDOM COMPLETE: CPT

## 2022-07-24 ENCOUNTER — TRANSCRIBE ORDER (OUTPATIENT)
Dept: SCHEDULING | Age: 32
End: 2022-07-24

## 2022-07-24 DIAGNOSIS — R10.31 RLQ ABDOMINAL PAIN: ICD-10-CM

## 2022-07-24 DIAGNOSIS — R19.7 DIARRHEA: Primary | ICD-10-CM

## 2022-07-29 ENCOUNTER — HOSPITAL ENCOUNTER (EMERGENCY)
Age: 32
Discharge: HOME | End: 2022-07-29
Attending: EMERGENCY MEDICINE
Payer: COMMERCIAL

## 2022-07-29 ENCOUNTER — APPOINTMENT (OUTPATIENT)
Dept: GENERAL RADIOLOGY | Age: 32
End: 2022-07-29
Attending: EMERGENCY MEDICINE
Payer: COMMERCIAL

## 2022-07-29 VITALS
RESPIRATION RATE: 23 BRPM | HEIGHT: 68 IN | HEART RATE: 99 BPM | OXYGEN SATURATION: 94 % | DIASTOLIC BLOOD PRESSURE: 70 MMHG | TEMPERATURE: 99.4 F | SYSTOLIC BLOOD PRESSURE: 111 MMHG | WEIGHT: 228.62 LBS | BODY MASS INDEX: 34.65 KG/M2

## 2022-07-29 DIAGNOSIS — U07.1 COVID-19: Primary | ICD-10-CM

## 2022-07-29 LAB
ALBUMIN SERPL-MCNC: 3.9 G/DL (ref 3.5–5)
ALBUMIN/GLOB SERPL: 1.2 {RATIO} (ref 1.1–2.2)
ALP SERPL-CCNC: 117 U/L (ref 45–117)
ALT SERPL-CCNC: 29 U/L (ref 12–78)
ANION GAP SERPL CALC-SCNC: 6 MMOL/L (ref 5–15)
AST SERPL-CCNC: 30 U/L (ref 15–37)
ATRIAL RATE: 111 BPM
BASOPHILS # BLD: 0.1 K/UL (ref 0–0.1)
BASOPHILS NFR BLD: 1 % (ref 0–1)
BILIRUB SERPL-MCNC: 0.6 MG/DL (ref 0.2–1)
BUN SERPL-MCNC: 10 MG/DL (ref 6–20)
BUN/CREAT SERPL: 9 (ref 12–20)
CALCIUM SERPL-MCNC: 8.9 MG/DL (ref 8.5–10.1)
CALCULATED P AXIS, ECG09: 40 DEGREES
CALCULATED R AXIS, ECG10: 12 DEGREES
CALCULATED T AXIS, ECG11: 55 DEGREES
CHLORIDE SERPL-SCNC: 109 MMOL/L (ref 97–108)
CO2 SERPL-SCNC: 25 MMOL/L (ref 21–32)
COVID-19 RAPID TEST, COVR: DETECTED
CREAT SERPL-MCNC: 1.15 MG/DL (ref 0.7–1.3)
DIAGNOSIS, 93000: NORMAL
DIFFERENTIAL METHOD BLD: ABNORMAL
EOSINOPHIL # BLD: 0.1 K/UL (ref 0–0.4)
EOSINOPHIL NFR BLD: 1 % (ref 0–7)
ERYTHROCYTE [DISTWIDTH] IN BLOOD BY AUTOMATED COUNT: 12 % (ref 11.5–14.5)
GLOBULIN SER CALC-MCNC: 3.3 G/DL (ref 2–4)
GLUCOSE SERPL-MCNC: 109 MG/DL (ref 65–100)
HCT VFR BLD AUTO: 45.3 % (ref 36.6–50.3)
HGB BLD-MCNC: 14.9 G/DL (ref 12.1–17)
IMM GRANULOCYTES # BLD AUTO: 0.1 K/UL (ref 0–0.04)
IMM GRANULOCYTES NFR BLD AUTO: 1 % (ref 0–0.5)
LACTATE BLD-SCNC: 1.54 MMOL/L (ref 0.4–2)
LYMPHOCYTES # BLD: 0.5 K/UL (ref 0.8–3.5)
LYMPHOCYTES NFR BLD: 8 % (ref 12–49)
MCH RBC QN AUTO: 30.1 PG (ref 26–34)
MCHC RBC AUTO-ENTMCNC: 32.9 G/DL (ref 30–36.5)
MCV RBC AUTO: 91.5 FL (ref 80–99)
MONOCYTES # BLD: 0.4 K/UL (ref 0–1)
MONOCYTES NFR BLD: 7 % (ref 5–13)
NEUTS SEG # BLD: 4.6 K/UL (ref 1.8–8)
NEUTS SEG NFR BLD: 82 % (ref 32–75)
NRBC # BLD: 0 K/UL (ref 0–0.01)
NRBC BLD-RTO: 0 PER 100 WBC
P-R INTERVAL, ECG05: 122 MS
PLATELET # BLD AUTO: 294 K/UL (ref 150–400)
PMV BLD AUTO: 9.6 FL (ref 8.9–12.9)
POTASSIUM SERPL-SCNC: 4 MMOL/L (ref 3.5–5.1)
PROT SERPL-MCNC: 7.2 G/DL (ref 6.4–8.2)
Q-T INTERVAL, ECG07: 308 MS
QRS DURATION, ECG06: 74 MS
QTC CALCULATION (BEZET), ECG08: 418 MS
RBC # BLD AUTO: 4.95 M/UL (ref 4.1–5.7)
RBC MORPH BLD: ABNORMAL
SODIUM SERPL-SCNC: 140 MMOL/L (ref 136–145)
SOURCE, COVRS: ABNORMAL
TROPONIN-HIGH SENSITIVITY: 4 NG/L (ref 0–76)
VENTRICULAR RATE, ECG03: 111 BPM
WBC # BLD AUTO: 5.8 K/UL (ref 4.1–11.1)

## 2022-07-29 PROCEDURE — 74011250636 HC RX REV CODE- 250/636: Performed by: EMERGENCY MEDICINE

## 2022-07-29 PROCEDURE — 87635 SARS-COV-2 COVID-19 AMP PRB: CPT

## 2022-07-29 PROCEDURE — 96374 THER/PROPH/DIAG INJ IV PUSH: CPT

## 2022-07-29 PROCEDURE — 85025 COMPLETE CBC W/AUTO DIFF WBC: CPT

## 2022-07-29 PROCEDURE — 83605 ASSAY OF LACTIC ACID: CPT

## 2022-07-29 PROCEDURE — 36415 COLL VENOUS BLD VENIPUNCTURE: CPT

## 2022-07-29 PROCEDURE — 96361 HYDRATE IV INFUSION ADD-ON: CPT

## 2022-07-29 PROCEDURE — 99285 EMERGENCY DEPT VISIT HI MDM: CPT

## 2022-07-29 PROCEDURE — 93005 ELECTROCARDIOGRAM TRACING: CPT

## 2022-07-29 PROCEDURE — 74011250637 HC RX REV CODE- 250/637: Performed by: EMERGENCY MEDICINE

## 2022-07-29 PROCEDURE — 96375 TX/PRO/DX INJ NEW DRUG ADDON: CPT

## 2022-07-29 PROCEDURE — 80053 COMPREHEN METABOLIC PANEL: CPT

## 2022-07-29 PROCEDURE — 84484 ASSAY OF TROPONIN QUANT: CPT

## 2022-07-29 PROCEDURE — 71045 X-RAY EXAM CHEST 1 VIEW: CPT

## 2022-07-29 RX ORDER — KETOROLAC TROMETHAMINE 30 MG/ML
30 INJECTION, SOLUTION INTRAMUSCULAR; INTRAVENOUS
Status: COMPLETED | OUTPATIENT
Start: 2022-07-29 | End: 2022-07-29

## 2022-07-29 RX ORDER — PREDNISONE 20 MG/1
60 TABLET ORAL DAILY
Qty: 15 TABLET | Refills: 0 | Status: SHIPPED | OUTPATIENT
Start: 2022-07-29 | End: 2022-08-03

## 2022-07-29 RX ORDER — DEXAMETHASONE SODIUM PHOSPHATE 10 MG/ML
10 INJECTION INTRAMUSCULAR; INTRAVENOUS ONCE
Status: COMPLETED | OUTPATIENT
Start: 2022-07-29 | End: 2022-07-29

## 2022-07-29 RX ORDER — ACETAMINOPHEN 500 MG
1000 TABLET ORAL
Status: COMPLETED | OUTPATIENT
Start: 2022-07-29 | End: 2022-07-29

## 2022-07-29 RX ADMIN — ACETAMINOPHEN 1000 MG: 500 TABLET ORAL at 06:17

## 2022-07-29 RX ADMIN — DEXAMETHASONE SODIUM PHOSPHATE 10 MG: 10 INJECTION, SOLUTION INTRAMUSCULAR; INTRAVENOUS at 07:39

## 2022-07-29 RX ADMIN — SODIUM CHLORIDE 1000 ML: 9 INJECTION, SOLUTION INTRAVENOUS at 06:17

## 2022-07-29 RX ADMIN — KETOROLAC TROMETHAMINE 30 MG: 30 INJECTION, SOLUTION INTRAMUSCULAR at 07:39

## 2022-07-29 NOTE — ED PROVIDER NOTES
EMERGENCY DEPARTMENT HISTORY AND PHYSICAL EXAM      Date: 7/29/2022  Patient Name: Roberta Halsted    History of Presenting Illness     Chief Complaint   Patient presents with    Fever     Pt presents w/ c/o fever and cough x1 day, pt has not been tested for covid. History Provided By: Patient, Patient's Father, and Patient's Mother    HPI: Roberta Halsted, 27 y.o. male with PMHx significant for bipolar disorder who presents with a chief complaint of cough and fever beginning yesterday. Reports a temp at home as high as 101.3. Parents report that he was \"shaking\" this morning because of his fever. Has had multiple sick contacts at work who have tested positive for COVID-19. He is vaccinated. Has taken Tylenol yesterday but no other medications prior to arrival.  No nausea, vomiting, diarrhea, urinary symptoms. PCP: Angely Brooks MD    There are no other complaints, changes, or physical findings at this time. Current Outpatient Medications   Medication Sig Dispense Refill    predniSONE (DELTASONE) 20 mg tablet Take 3 Tablets by mouth in the morning for 5 days. 15 Tablet 0    predniSONE (STERAPRED DS) 10 mg dose pack Take 1 Tablet by mouth See Admin Instructions. See administration instruction per 10mg dose pack (Patient not taking: Reported on 6/21/2022) 21 Tablet 0    lamoTRIgine (LaMICtal) 100 mg tablet Take 100 mg by mouth daily. pimozide (ORAP PO) Take  by mouth daily. topiramate (TOPAMAX) 50 mg tablet Take  by mouth two (2) times a day. sertraline (Zoloft) 100 mg tablet Take 100 mg by mouth daily. cetirizine (ZYRTEC) 10 mg tablet Take 1 Tablet by mouth daily as needed for Allergies (Cough). (Patient not taking: Reported on 12/3/2021) 30 Tablet 2    albuterol (PROVENTIL HFA, VENTOLIN HFA, PROAIR HFA) 90 mcg/actuation inhaler Take 2 Puffs by inhalation every four (4) hours as needed (cough).  (Patient not taking: Reported on 6/21/2022) 1 Inhaler 0     Past History Past Medical History:  Past Medical History:   Diagnosis Date    ADHD (attention deficit hyperactivity disorder)     Allergic rhinitis 10/16/2017    Bipolar disorder ([facility] [address] 10/16/2017    Insomnia 10/16/2017    Otitis externa of right ear 10/16/2017    Tourette's 10/16/2017     Past Surgical History:  Past Surgical History:   Procedure Laterality Date    HX HEENT      HX TONSIL AND ADENOIDECTOMY       Family History:  Family History   Problem Relation Age of Onset    Heart Disease Mother     No Known Problems Father      Social History:  Social History     Tobacco Use    Smoking status: Never    Smokeless tobacco: Never   Vaping Use    Vaping Use: Never used   Substance Use Topics    Alcohol use: Yes    Drug use: No     Allergies: Allergies   Allergen Reactions    Bactrim [Sulfamethoxazole-Trimethoprim] Anaphylaxis     Patient given Bactrim DS this morning for sinus infection. Sister called this afternoon patient is in ER with severe reaction; Dr. Lisandra Guadarrama notified    Sulfa (Sulfonamide Antibiotics) Anaphylaxis    Lorabid [Loracarbef] Hives    Cefdinir Unknown (comments)     Review of Systems   Review of Systems   Constitutional:  Positive for chills and fever. HENT:  Negative for congestion, rhinorrhea and sore throat. Respiratory:  Positive for cough. Negative for shortness of breath. Cardiovascular:  Negative for chest pain. Gastrointestinal:  Negative for abdominal pain, nausea and vomiting. Genitourinary:  Negative for dysuria and urgency. Skin:  Negative for rash. Neurological:  Negative for dizziness, light-headedness and headaches. All other systems reviewed and are negative. Physical Exam   Physical Exam  Vitals and nursing note reviewed. Constitutional:       General: He is not in acute distress. Appearance: He is well-developed. HENT:      Head: Normocephalic and atraumatic.    Eyes:      Conjunctiva/sclera: Conjunctivae normal.      Pupils: Pupils are equal, round, and reactive to light. Cardiovascular:      Rate and Rhythm: Regular rhythm. Tachycardia present. Pulmonary:      Effort: Pulmonary effort is normal. No respiratory distress. Breath sounds: No stridor. Wheezing present. Abdominal:      General: There is no distension. Palpations: Abdomen is soft. Tenderness: There is no abdominal tenderness. Musculoskeletal:         General: Normal range of motion. Cervical back: Normal range of motion. Skin:     General: Skin is warm and dry. Neurological:      Mental Status: He is alert and oriented to person, place, and time. Psychiatric:         Mood and Affect: Mood normal.         Thought Content:  Thought content normal.     Diagnostic Study Results   Labs -     Recent Results (from the past 12 hour(s))   EKG, 12 LEAD, INITIAL    Collection Time: 07/29/22  5:42 AM   Result Value Ref Range    Ventricular Rate 111 BPM    Atrial Rate 111 BPM    P-R Interval 122 ms    QRS Duration 74 ms    Q-T Interval 308 ms    QTC Calculation (Bezet) 418 ms    Calculated P Axis 40 degrees    Calculated R Axis 12 degrees    Calculated T Axis 55 degrees    Diagnosis       Sinus tachycardia  When compared with ECG of 16-OCT-2017 14:19,  Nonspecific T wave abnormality no longer evident in Inferior leads     POC LACTIC ACID    Collection Time: 07/29/22  5:55 AM   Result Value Ref Range    Lactic Acid (POC) 1.54 0.40 - 2.00 mmol/L   COVID-19 RAPID TEST    Collection Time: 07/29/22  5:56 AM   Result Value Ref Range    Specimen source Nasopharyngeal      COVID-19 rapid test Detected (A) NOTD     TROPONIN-HIGH SENSITIVITY    Collection Time: 07/29/22  5:56 AM   Result Value Ref Range    Troponin-High Sensitivity 4 0 - 76 ng/L   CBC WITH AUTOMATED DIFF    Collection Time: 07/29/22  5:56 AM   Result Value Ref Range    WBC 5.8 4.1 - 11.1 K/uL    RBC 4.95 4.10 - 5.70 M/uL    HGB 14.9 12.1 - 17.0 g/dL    HCT 45.3 36.6 - 50.3 %    MCV 91.5 80.0 - 99.0 FL    MCH 30.1 26.0 - 34.0 PG    MCHC 32.9 30.0 - 36.5 g/dL    RDW 12.0 11.5 - 14.5 %    PLATELET 226 645 - 835 K/uL    MPV 9.6 8.9 - 12.9 FL    NRBC 0.0 0  WBC    ABSOLUTE NRBC 0.00 0.00 - 0.01 K/uL    NEUTROPHILS 82 (H) 32 - 75 %    LYMPHOCYTES 8 (L) 12 - 49 %    MONOCYTES 7 5 - 13 %    EOSINOPHILS 1 0 - 7 %    BASOPHILS 1 0 - 1 %    IMMATURE GRANULOCYTES 1 (H) 0.0 - 0.5 %    ABS. NEUTROPHILS 4.6 1.8 - 8.0 K/UL    ABS. LYMPHOCYTES 0.5 (L) 0.8 - 3.5 K/UL    ABS. MONOCYTES 0.4 0.0 - 1.0 K/UL    ABS. EOSINOPHILS 0.1 0.0 - 0.4 K/UL    ABS. BASOPHILS 0.1 0.0 - 0.1 K/UL    ABS. IMM. GRANS. 0.1 (H) 0.00 - 0.04 K/UL    DF SMEAR SCANNED      RBC COMMENTS NORMOCYTIC, NORMOCHROMIC     METABOLIC PANEL, COMPREHENSIVE    Collection Time: 07/29/22  5:56 AM   Result Value Ref Range    Sodium 140 136 - 145 mmol/L    Potassium 4.0 3.5 - 5.1 mmol/L    Chloride 109 (H) 97 - 108 mmol/L    CO2 25 21 - 32 mmol/L    Anion gap 6 5 - 15 mmol/L    Glucose 109 (H) 65 - 100 mg/dL    BUN 10 6 - 20 MG/DL    Creatinine 1.15 0.70 - 1.30 MG/DL    BUN/Creatinine ratio 9 (L) 12 - 20      GFR est AA >60 >60 ml/min/1.73m2    GFR est non-AA >60 >60 ml/min/1.73m2    Calcium 8.9 8.5 - 10.1 MG/DL    Bilirubin, total 0.6 0.2 - 1.0 MG/DL    ALT (SGPT) 29 12 - 78 U/L    AST (SGOT) 30 15 - 37 U/L    Alk. phosphatase 117 45 - 117 U/L    Protein, total 7.2 6.4 - 8.2 g/dL    Albumin 3.9 3.5 - 5.0 g/dL    Globulin 3.3 2.0 - 4.0 g/dL    A-G Ratio 1.2 1.1 - 2.2         Radiologic Studies -   XR CHEST PORT   Final Result   Low lung volumes without acute cardiopulmonary process. XR CHEST PORT    Result Date: 7/29/2022  Low lung volumes without acute cardiopulmonary process. Medical Decision Making   I am the first provider for this patient. I reviewed the vital signs, available nursing notes, past medical history, past surgical history, family history and social history. Vital Signs-Reviewed the patient's vital signs.   Patient Vitals for the past 12 hrs:   Temp Pulse Resp BP SpO2   07/29/22 0915 99.4 °F (37.4 °C) 99 23 111/70 94 %   07/29/22 0900 -- 87 26 103/70 94 %   07/29/22 0830 -- 99 22 96/61 94 %   07/29/22 0825 -- 97 29 -- 93 %   07/29/22 0800 -- 100 28 107/67 94 %   07/29/22 0730 98.7 °F (37.1 °C) (!) 105 28 116/64 97 %   07/29/22 0540 (!) 102.7 °F (39.3 °C) (!) 117 18 127/76 99 %       Pulse Oximetry Analysis - 97% on ra    Cardiac Monitor:   Rate: 110 bpm  Rhythm: Sinus Tachycardia      ED EKG interpretation:  Rhythm: sinus tachycardia; and regular . Rate (approx.): 111; Axis: normal; P wave: normal; QRS interval: normal ; ST/T wave: non-specific changes; Other findings: borderline ekg. This EKG was interpreted by TREMAINE Montanez MD,ED Provider. Records Reviewed: Nursing Notes and Old Medical Records    Provider Notes (Medical Decision Making):   Patient presents with a chief complaint of cough and fever who presents with a chief complaint of feeling generally unwell, cough, and fever since yesterday. Suspect likely COVID-19. On presentation he is febrile and tachycardic. Does have some wheezing in the lower lung fields. Plan for basic lab work, chest x-ray, COVID-19 test, IV steroids, antipyretics, IV fluids, reevaluation. ED Course:   Initial assessment performed. The patients presenting problems have been discussed, and they are in agreement with the care plan formulated and outlined with them. I have encouraged them to ask questions as they arise throughout their visit. COVID test returned positive. Vital signs improved with IV fluids and antipyretics. X-ray with no evidence of pneumonia. Will discharge on a short course of steroids given wheezing on presentation. Patient does have inhaler at home that he reports has plenty of puffs left on it. He was advised to use this for the next several days on a schedule and then as needed.        Procedures:  Procedures    Critical Care:  none    Disposition:  Discharge Note:  The patient has been re-evaluated and is ready for discharge. Reviewed available results with patient. Counseled patient on diagnosis and care plan. Patient has expressed understanding, and all questions have been answered. Patient agrees with plan and agrees to follow up as recommended, or to return to the ED if their symptoms worsen. Discharge instructions have been provided and explained to the patient, along with reasons to return to the ED. PLAN:  1. Discharge Medication List as of 7/29/2022  9:25 AM        START taking these medications    Details   predniSONE (DELTASONE) 20 mg tablet Take 3 Tablets by mouth in the morning for 5 days. , Normal, Disp-15 Tablet, R-0           CONTINUE these medications which have NOT CHANGED    Details   predniSONE (STERAPRED DS) 10 mg dose pack Take 1 Tablet by mouth See Admin Instructions. See administration instruction per 10mg dose pack, Normal, Disp-21 Tablet, R-0      lamoTRIgine (LaMICtal) 100 mg tablet Take 100 mg by mouth daily. , Historical Med      pimozide (ORAP PO) Take  by mouth daily. , Historical Med      topiramate (TOPAMAX) 50 mg tablet Take  by mouth two (2) times a day., Historical Med      sertraline (Zoloft) 100 mg tablet Take 100 mg by mouth daily. , Historical Med      cetirizine (ZYRTEC) 10 mg tablet Take 1 Tablet by mouth daily as needed for Allergies (Cough). , Normal, Disp-30 Tablet, R-2      albuterol (PROVENTIL HFA, VENTOLIN HFA, PROAIR HFA) 90 mcg/actuation inhaler Take 2 Puffs by inhalation every four (4) hours as needed (cough). , Normal, Disp-1 Inhaler, R-0           2.    Follow-up Information       Follow up With Specialties Details Why Contact Info    Woodward Meckel, MD Internal Medicine Physician Schedule an appointment as soon as possible for a visit   [place]  [phone]      [facility] EMERGENCY DEPT Emergency Medicine  As needed, If symptoms worsen [address]  [address]  [phone]          Return to ED if worse     Diagnosis     Clinical Impression:   1. COVID-19            Please note that this dictation was completed with [email or web address], the computer voice recognition software. Quite often unanticipated grammatical, syntax, homophones, and other interpretive errors are inadvertently transcribed by the computer software. Please disregard these errors.   Please excuse any errors that have escaped final proofreading

## 2022-07-29 NOTE — Clinical Note
[address]  [facility] EMERGENCY DEPT  [address] [address]  [phone]    Work/School Note    Date: 7/29/2022     To Whom It May concern:    Latricia Damian was evaluated by the following provider(s):  Attending Provider: Sumit Paredes [address] [place] [address] virus is suspected. Per the CDC guidelines we recommend home isolation until the following conditions are all met:    1. At least five days have passed since symptoms first appeared and/or had a close exposure,   2. After home isolation for five days, wearing a mask around others for the next five days,  3. At least 24 have passed since last fever without the use of fever-reducing medications and  4.  Symptoms (eg cough, shortness of breath) have improved      Sincerely,          Trish Sal MD

## 2022-07-29 NOTE — Clinical Note
Aayush Marte was seen and treated in our emergency department on 7/29/2022. COVID 19 test is positive     Per the CDC guidelines we recommend home isolation until the following conditions are all met:     1. At least five days have passed since symptoms first appeared and/or had a close exposure,   2. After home isolation for five days, wearing a mask around others for the next five days,  3. At least 24 have passed since last fever without the use of fever-reducing medications and  4.  Symptoms (eg cough, shortness of breath) have improved    Jignesh Adams MD

## 2022-08-01 ENCOUNTER — PATIENT OUTREACH (OUTPATIENT)
Dept: CASE MANAGEMENT | Age: 32
End: 2022-08-01

## 2022-08-01 NOTE — PROGRESS NOTES
Patient contacted regarding COVID-19 diagnosis. Discussed COVID-19 related testing which was available at this time. Test results were positive. Patient informed of results, if available? yes. Ambulatory Care Manager contacted the patient by telephone to perform post discharge assessment. Call within 2 business days of discharge: Yes Verified name and  with patient as identifiers. Provided introduction to self, and explanation of the CTN/ACM role, and reason for call due to risk factors for infection and/or exposure to COVID-19. Symptoms reviewed with patient who verbalized the following symptoms: fatigue, cough, and congestion      Due to no new or worsening symptoms encounter was not routed to provider for escalation. Discussed follow-up appointments. If no appointment was previously scheduled, appointment scheduling offered:  no. [address] follow up appointment(s):   Future Appointments   Date Time Provider Pete Alfredo   2022 12:30 PM [address] 2 MRMT [facility] REG   10/4/2022 10:30 AM Hermes Canales MD [facility] BS [facility]     Non-BS follow up appointment(s): Interventions to address risk factors: Obtained and reviewed discharge summary and/or continuity of care documents     Advance Care Planning:   Does patient have an Advance Directive: not on file. Educated patient about risk for severe COVID-19 due to risk factors according to CDC guidelines. ACM reviewed discharge instructions, medical action plan and red flag symptoms with the patient who verbalized understanding. Discussed COVID vaccination status: yes. Education provided on COVID-19 vaccination as appropriate. Discussed exposure protocols and quarantine with CDC Guidelines. Patient was given an opportunity to verbalize any questions and concerns and agrees to contact ACM or health care provider for questions related to their healthcare.     Reviewed and educated patient on any new and changed medications related to discharge diagnosis     Was patient discharged with a pulse oximeter? no    ACM provided contact information. Plan for follow-up call in 5-7 days based on severity of symptoms and risk factors.

## 2022-08-08 ENCOUNTER — PATIENT OUTREACH (OUTPATIENT)
Dept: CASE MANAGEMENT | Age: 32
End: 2022-08-08

## 2022-08-08 NOTE — PROGRESS NOTES
Patient resolved from Transition of Care episode on 8/8/22. ACM/CTN was unsuccessful at contacting this patient today. Patient/family was provided the following resources and education related to COVID-19 during the initial call:                         Signs, symptoms and red flags related to COVID-19            CDC exposure and quarantine guidelines            Conduit exposure contact - [phone]            Contact for their local Department of Health                 Patient has not had any additional ED or hospital visits. No further outreach scheduled with this CTN/ACM. Episode of Care resolved. Patient has this CTN/ACM contact information if future needs arise.

## 2022-08-18 ENCOUNTER — TELEPHONE (OUTPATIENT)
Dept: INTERNAL MEDICINE CLINIC | Age: 32
End: 2022-08-18

## 2022-08-18 NOTE — TELEPHONE ENCOUNTER
Patients mother Shawna called to see if Dr. Ivett Lombardi would write a short letter to Daniel's work due to him having diarrhea. She states his work is getting upset due to him going to the bathroom to much. She said his CT scan in on 9/6/22 and colonoscopy is at the end of Sept.  Please advise.

## 2022-08-18 NOTE — LETTER
8/23/2022 11:59 AM        To whom it may concern:    Regarding:    Mr. Prachi Walsh  [address]  [address] [address]    Mr. Stacey Zuniga is being evaluated for a gastrointestinal disorder that may cause loose stools which causes him to frequent the bathroom. He has follow-up testing with a specialist and imaging studies planned. Thank you for your understanding and cooperation in this matter.           Sincerely,      Kristi Perdue MD

## 2022-10-11 ENCOUNTER — OFFICE VISIT (OUTPATIENT)
Dept: INTERNAL MEDICINE CLINIC | Age: 32
End: 2022-10-11
Payer: COMMERCIAL

## 2022-10-11 VITALS
DIASTOLIC BLOOD PRESSURE: 70 MMHG | TEMPERATURE: 98.9 F | SYSTOLIC BLOOD PRESSURE: 126 MMHG | HEIGHT: 68 IN | WEIGHT: 230.8 LBS | BODY MASS INDEX: 34.98 KG/M2 | OXYGEN SATURATION: 98 % | HEART RATE: 78 BPM | RESPIRATION RATE: 20 BRPM

## 2022-10-11 DIAGNOSIS — F31.61 BIPOLAR DISORDER, CURRENT EPISODE MIXED, MILD (HCC): ICD-10-CM

## 2022-10-11 DIAGNOSIS — Z23 NEEDS FLU SHOT: ICD-10-CM

## 2022-10-11 DIAGNOSIS — Z00.00 ANNUAL PHYSICAL EXAM: Primary | ICD-10-CM

## 2022-10-11 PROCEDURE — 90686 IIV4 VACC NO PRSV 0.5 ML IM: CPT | Performed by: INTERNAL MEDICINE

## 2022-10-11 PROCEDURE — 90471 IMMUNIZATION ADMIN: CPT | Performed by: INTERNAL MEDICINE

## 2022-10-11 PROCEDURE — 99395 PREV VISIT EST AGE 18-39: CPT | Performed by: INTERNAL MEDICINE

## 2022-10-11 NOTE — PATIENT INSTRUCTIONS
Vaccine Information Statement    Influenza (Flu) Vaccine (Inactivated or Recombinant): What You Need to Know    Many vaccine information statements are available in [place] and other languages. See www.immunize.org/vis. Hojas de información sobre vacunas están disponibles en español y en muchos otros idiomas. Visite www.immunize.org/vis. 1. Why get vaccinated? Influenza vaccine can prevent influenza (flu). Flu is a contagious disease that spreads around the [facility] every year, usually between October and May. Anyone can get the flu, but it is more dangerous for some people. Infants and young children, people 72 years and older, pregnant people, and people with certain health conditions or a weakened immune system are at greatest risk of flu complications. Pneumonia, bronchitis, sinus infections, and ear infections are examples of flu-related complications. If you have a medical condition, such as heart disease, cancer, or diabetes, flu can make it worse. Flu can cause fever and chills, sore throat, muscle aches, fatigue, cough, headache, and runny or stuffy nose. Some people may have vomiting and diarrhea, though this is more common in children than adults. In an average year, thousands of people in the [facility] die from flu, and many more are hospitalized. Flu vaccine prevents millions of illnesses and flu-related visits to the doctor each year. 2. Influenza vaccines     CDC recommends everyone 6 months and older get vaccinated every flu season. Children 6 months through 6years of age may need 2 doses during a single flu season. Everyone else needs only 1 dose each flu season. It takes about 2 weeks for protection to develop after vaccination. There are many flu viruses, and they are always changing. Each year a new flu vaccine is made to protect against the influenza viruses believed to be likely to cause disease in the upcoming flu season.  Even when the vaccine doesnt exactly match these viruses, it may still provide some protection. Influenza vaccine does not cause flu. Influenza vaccine may be given at the same time as other vaccines. 3. Talk with your health care provider    Tell your vaccination provider if the person getting the vaccine:  Has had an allergic reaction after a previous dose of influenza vaccine, or has any severe, life-threatening allergies   Has ever had Guillain-Barré Syndrome (also called GBS)    In some cases, your health care provider may decide to postpone influenza vaccination until a future visit. Influenza vaccine can be administered at any time during pregnancy. People who are or will be pregnant during influenza season should receive inactivated influenza vaccine. People with minor illnesses, such as a cold, may be vaccinated. People who are moderately or severely ill should usually wait until they recover before getting influenza vaccine. Your health care provider can give you more information. 4. Risks of a vaccine reaction    Soreness, redness, and swelling where the shot is given, fever, muscle aches, and headache can happen after influenza vaccination. There may be a very small increased risk of Guillain-Barré Syndrome (GBS) after inactivated influenza vaccine (the flu shot). [place] children who get the flu shot along with pneumococcal vaccine (PCV13) and/or DTaP vaccine at the same time might be slightly more likely to have a seizure caused by fever. Tell your health care provider if a child who is getting flu vaccine has ever had a seizure. People sometimes faint after medical procedures, including vaccination. Tell your provider if you feel dizzy or have vision changes or ringing in the ears. As with any medicine, there is a very remote chance of a vaccine causing a severe allergic reaction, other serious injury, or death. 5. What if there is a serious problem?     An allergic reaction could occur after the vaccinated person leaves the clinic. If you see signs of a severe allergic reaction (hives, swelling of the face and throat, difficulty breathing, a fast heartbeat, dizziness, or weakness), call 9-1-1 and get the person to the nearest hospital.    For other signs that concern you, call your health care provider. Adverse reactions should be reported to the Vaccine Adverse Event Reporting System (VAERS). Your health care provider will usually file this report, or you can do it yourself. Visit the VAERS website at www.vaers. [facility].gov or call 9-468.485.3652. VAERS is only for reporting reactions, and VAERS staff members do not give medical advice. 6. The National Vaccine Injury Compensation Program    The [facility] Vaccine Injury Compensation Program (VICP) is a federal program that was created to compensate people who may have been injured by certain vaccines. Claims regarding alleged injury or death due to vaccination have a time limit for filing, which may be as short as two years. Visit the VICP website at www.[facility].gov/vaccinecompensation or call 4-257.261.6723 to learn about the program and about filing a claim. 7. How can I learn more? Ask your health care provider. Call your local or state health department. Visit the website of the Food and Drug Administration (FDA) for vaccine package inserts and additional information at www.fda.gov/vaccines-blood-biologics/vaccines. Contact the Centers for Disease Control and Prevention (CDC): Call 1-198.814.1793 (9-678-VZX-INFO) or  Visit CDCs influenza website at www.cdc.gov/flu. Vaccine Information Statement   Inactivated Influenza Vaccine   8/6/2021  [id number]   Department of Health and Human Services  Centers for Disease Control and Prevention    Office Use Only

## 2022-10-11 NOTE — PROGRESS NOTES
Wallace Ochoa is a 27 y.o. male and presents with Follow Up Chronic Condition (3 mo fu)  . Subjective:  Mr. Caitlyn Hannah presents today for follow-up and a complete physical.  He had COVID back in August but has recovered completely without event. He has been having some abdominal issues about 3 months ago and at that time had been referred to gastroenterology for evaluation. He underwent colonoscopy and CT scan which were normal.  In retrospect he notes that he was under a significant amount of stress at his job which was probably causing his symptoms. He is no longer working there and his symptoms have completely resolved. He continues medications for bipolar disorder, ADHD and Tourette's. He denies any physical complaints today. He has no shortness of breath, chest pain, palpitations, PND, orthopnea, or pedal edema. Past Medical History:   Diagnosis Date    ADHD (attention deficit hyperactivity disorder)     Allergic rhinitis 10/16/2017    Bipolar disorder ([facility] [address] 10/16/2017    Insomnia 10/16/2017    Otitis externa of right ear 10/16/2017    Tourette's 10/16/2017     Past Surgical History:   Procedure Laterality Date    HX HEENT      HX TONSIL AND ADENOIDECTOMY       Allergies   Allergen Reactions    Bactrim [Sulfamethoxazole-Trimethoprim] Anaphylaxis     Patient given Bactrim DS this morning for sinus infection. Sister called this afternoon patient is in ER with severe reaction; Dr. Emery Bashir notified    Sulfa (Sulfonamide Antibiotics) Anaphylaxis    Lorabid [Loracarbef] Hives    Cefdinir Unknown (comments)     Current Outpatient Medications   Medication Sig Dispense Refill    lamoTRIgine (LaMICtal) 100 mg tablet Take 100 mg by mouth daily. pimozide (ORAP PO) Take  by mouth daily. topiramate (TOPAMAX) 50 mg tablet Take  by mouth two (2) times a day. sertraline (ZOLOFT) 100 mg tablet Take 100 mg by mouth daily.        Social History     Socioeconomic History    Marital status: SINGLE Tobacco Use    Smoking status: Never    Smokeless tobacco: Never   Vaping Use    Vaping Use: Never used   Substance and Sexual Activity    Alcohol use: Yes    Drug use: No     Family History   Problem Relation Age of Onset    Heart Disease Mother     No Known Problems Father        Review of Systems  Constitutional:  negative for fevers, chills, anorexia and weight loss  Eyes:    negative for visual disturbance and irritation  ENT:    negative for tinnitus,sore throat,nasal congestion,ear pains. hoarseness  Respiratory:     negative for cough, hemoptysis, dyspnea,wheezing  CV:    negative for chest pain, palpitations, lower extremity edema  GI:    negative for nausea, vomiting, diarrhea, abdominal pain,melena  Endo:               negative for polyuria,polydipsia,polyphagia,heat intolerance  Genitourinary : negative for frequency, dysuria and hematuria  Integumentary: negative for rash and pruritus  Hematologic:   negative for easy bruising and gum/nose bleeding  Musculoskel:  negative for myalgias, arthralgias, back pain, muscle weakness, joint pain  Neurological:   negative for headaches, dizziness, vertigo, memory problems and gait   Behavl/Psych:  negative for feelings of anxiety, depression, mood changes  ROS otherwise negative      Objective:  Visit Vitals  /70 (BP 1 Location: Left upper arm, BP Patient Position: Sitting, BP Cuff Size: Adult)   Pulse 78   Temp 98.9 °F (37.2 °C) (Oral)   Resp 20   Ht 5' 8\" (1.727 m)   Wt 230 lb 12.8 oz (104.7 kg)   SpO2 98%   BMI 35.09 kg/m²     Physical Exam:   General appearance - alert, well appearing, and in no distress  Mental status - alert, oriented to person, place, and time  EYE-HENRY, EOMI, fundi normal, corneas normal, no foreign bodies  ENT-ENT exam normal, no neck nodes or sinus tenderness  Nose - normal and patent, no erythema, discharge or polyps  Mouth - mucous membranes moist, pharynx normal without lesions  Neck - supple, no significant adenopathy Chest - clear to auscultation, no wheezes, rales or rhonchi, symmetric air entry   Heart - normal rate, regular rhythm, normal S1, S2, no murmurs, rubs, clicks or gallops   Abdomen - soft, nontender, nondistended, no masses or organomegaly  Lymph- no adenopathy palpable  Ext-peripheral pulses normal, no pedal edema, no clubbing or cyanosis  Skin-Warm and dry. no hyperpigmentation, vitiligo, or suspicious lesions  Neuro -alert, oriented, normal speech, no focal findings or movement disorder noted      Assessment/Plan:  Diagnoses and all orders for this visit:    1. Annual physical exam  -     LIPID PANEL; Future  -     GLUCOSE, FASTING; Future    2. Needs flu shot  -     INFLUENZA, FLUARIX, FLULAVAL, FLUZONE (AGE 6 MO+), AFLURIA(AGE 3Y+) IM, PF, 0.5 ML    3. Bipolar disorder, current episode mixed, mild ([facility]          ICD-10-CM ICD-9-CM    1. Annual physical exam  Z00.00 V70.0 LIPID PANEL      GLUCOSE, FASTING      GLUCOSE, FASTING      LIPID PANEL      2. Needs flu shot  Z23 V04.81 INFLUENZA, FLUARIX, FLULAVAL, FLUZONE (AGE 6 MO+), AFLURIA(AGE 3Y+) IM, PF, 0.5 ML      3. Bipolar disorder, current episode mixed, mild ([facility]  F31.61 296.61         Plan:    Normal routine health maintenance exam.  Flu vaccine to be given today. He has had extensive lab work done recently and will only have a glucose and lipid profile performed today. He will continue his current medications and follow-up in 1 year unless otherwise indicated. Follow-up and Dispositions    Return in about 1 year (around 10/11/2023) for CPE. I have reviewed with the patient details of the assessment and plan and all questions were answered. Relevent patient education was performed. Verbal and/or written instructions (see AVS) provided. The most recent lab findings were reviewed with the patient. Plan was discussed with patient who verbally expressed understanding. An After Visit Summary was printed and given to the patient.     ANUJ Toño Trent MD

## 2022-10-11 NOTE — PROGRESS NOTES
Shannon Porras is a 27 y.o. male presenting for Follow Up Chronic Condition (3 mo fu)  . 1. Have you been to the ER, urgent care clinic since your last visit? Hospitalized since your last visit? ER 7-29-22 for COVID    2. Have you seen or consulted any other health care providers outside of the [address] [place] [address] since your last visit? Include any pap smears or colon screening. Psychiatrist    Fall Risk Assessment, last 12 mths 1/6/2020   Able to walk? Yes   Fall in past 12 months? No         Abuse Screening Questionnaire 1/6/2020   Do you ever feel afraid of your partner? N   Are you in a relationship with someone who physically or mentally threatens you? N   Is it safe for you to go home? Y       3 most recent PHQ Screens 6/21/2022   Little interest or pleasure in doing things Not at all   Feeling down, depressed, irritable, or hopeless Not at all   Total Score PHQ 2 0       Medications Discontinued During This Encounter   Medication Reason    albuterol (PROVENTIL HFA, VENTOLIN HFA, PROAIR HFA) 90 mcg/actuation inhaler LIST CLEANUP    cetirizine (ZYRTEC) 10 mg tablet LIST CLEANUP    predniSONE (STERAPRED DS) 10 mg dose pack LIST CLEANUP   After obtaining written consent and per orders of Dr. Sarmad Lucas, injection of Flulaval given by Bossman Antonio CMA. Patient tolerated procedure well. VIS was given to them. No reactions noted.

## 2022-10-12 LAB
CHOLEST SERPL-MCNC: 219 MG/DL (ref 100–199)
HDLC SERPL-MCNC: 41 MG/DL
LDLC SERPL CALC-MCNC: 152 MG/DL (ref 0–99)
TRIGL SERPL-MCNC: 142 MG/DL (ref 0–149)
VLDLC SERPL CALC-MCNC: 26 MG/DL (ref 5–40)

## 2022-10-13 LAB — GLUCOSE SERPL-MCNC: 86 MG/DL (ref 70–99)

## 2022-12-28 ENCOUNTER — TRANSCRIBE ORDER (OUTPATIENT)
Dept: SCHEDULING | Age: 32
End: 2022-12-28

## 2022-12-28 DIAGNOSIS — K58.0 IRRITABLE BOWEL SYNDROME WITH DIARRHEA: Primary | ICD-10-CM

## 2022-12-28 DIAGNOSIS — R19.4 CHANGE IN BOWEL HABITS: ICD-10-CM

## 2023-01-04 ENCOUNTER — HOSPITAL ENCOUNTER (OUTPATIENT)
Dept: NUCLEAR MEDICINE | Age: 33
Discharge: HOME | End: 2023-01-04
Attending: NURSE PRACTITIONER
Payer: COMMERCIAL

## 2023-01-04 VITALS — WEIGHT: 230 LBS | BODY MASS INDEX: 34.97 KG/M2

## 2023-01-04 DIAGNOSIS — K58.0 IRRITABLE BOWEL SYNDROME WITH DIARRHEA: ICD-10-CM

## 2023-01-04 DIAGNOSIS — R19.4 CHANGE IN BOWEL HABITS: ICD-10-CM

## 2023-01-04 PROCEDURE — 78227 HEPATOBIL SYST IMAGE W/DRUG: CPT

## 2023-01-04 PROCEDURE — 74011250636 HC RX REV CODE- 250/636: Performed by: NURSE PRACTITIONER

## 2023-01-04 RX ORDER — KIT FOR THE PREPARATION OF TECHNETIUM TC 99M MEBROFENIN 45 MG/10ML
5.2 INJECTION, POWDER, LYOPHILIZED, FOR SOLUTION INTRAVENOUS
Status: COMPLETED | OUTPATIENT
Start: 2023-01-04 | End: 2023-01-04

## 2023-01-04 RX ADMIN — KIT FOR THE PREPARATION OF TECHNETIUM TC 99M MEBROFENIN 5.2 MILLICURIE: 45 INJECTION, POWDER, LYOPHILIZED, FOR SOLUTION INTRAVENOUS at 09:40

## 2023-01-04 RX ADMIN — SINCALIDE 2.09 MCG: 5 INJECTION, POWDER, LYOPHILIZED, FOR SOLUTION INTRAVENOUS at 10:35

## 2023-05-25 ENCOUNTER — OFFICE VISIT (OUTPATIENT)
Facility: CLINIC | Age: 33
End: 2023-05-25
Payer: COMMERCIAL

## 2023-05-25 VITALS
RESPIRATION RATE: 20 BRPM | OXYGEN SATURATION: 98 % | HEART RATE: 93 BPM | BODY MASS INDEX: 35.8 KG/M2 | TEMPERATURE: 98.4 F | HEIGHT: 68 IN | WEIGHT: 236.2 LBS | SYSTOLIC BLOOD PRESSURE: 130 MMHG | DIASTOLIC BLOOD PRESSURE: 76 MMHG

## 2023-05-25 DIAGNOSIS — J01.00 ACUTE MAXILLARY SINUSITIS, RECURRENCE NOT SPECIFIED: Primary | ICD-10-CM

## 2023-05-25 PROCEDURE — 99213 OFFICE O/P EST LOW 20 MIN: CPT | Performed by: INTERNAL MEDICINE

## 2023-05-25 RX ORDER — PIMOZIDE 2 MG/1
2 TABLET ORAL DAILY
COMMUNITY
Start: 2023-05-07

## 2023-05-25 RX ORDER — METHYLPREDNISOLONE 4 MG/1
TABLET ORAL
Qty: 21 TABLET | Refills: 0 | Status: SHIPPED | OUTPATIENT
Start: 2023-05-25 | End: 2023-05-31

## 2023-05-25 RX ORDER — LEVOFLOXACIN 500 MG/1
500 TABLET, FILM COATED ORAL DAILY
Qty: 10 TABLET | Refills: 0 | Status: SHIPPED | OUTPATIENT
Start: 2023-05-25 | End: 2023-06-04

## 2023-05-25 SDOH — ECONOMIC STABILITY: TRANSPORTATION INSECURITY
IN THE PAST 12 MONTHS, HAS LACK OF TRANSPORTATION KEPT YOU FROM MEETINGS, WORK, OR FROM GETTING THINGS NEEDED FOR DAILY LIVING?: NO

## 2023-05-25 SDOH — ECONOMIC STABILITY: HOUSING INSECURITY
IN THE LAST 12 MONTHS, WAS THERE A TIME WHEN YOU DID NOT HAVE A STEADY PLACE TO SLEEP OR SLEPT IN A SHELTER (INCLUDING NOW)?: NO

## 2023-05-25 SDOH — ECONOMIC STABILITY: FOOD INSECURITY: WITHIN THE PAST 12 MONTHS, YOU WORRIED THAT YOUR FOOD WOULD RUN OUT BEFORE YOU GOT MONEY TO BUY MORE.: NEVER TRUE

## 2023-05-25 SDOH — ECONOMIC STABILITY: FOOD INSECURITY: WITHIN THE PAST 12 MONTHS, THE FOOD YOU BOUGHT JUST DIDN'T LAST AND YOU DIDN'T HAVE MONEY TO GET MORE.: NEVER TRUE

## 2023-05-25 SDOH — ECONOMIC STABILITY: INCOME INSECURITY: HOW HARD IS IT FOR YOU TO PAY FOR THE VERY BASICS LIKE FOOD, HOUSING, MEDICAL CARE, AND HEATING?: NOT HARD AT ALL

## 2023-05-25 NOTE — PROGRESS NOTES
Serg Silva is a 27 y.o. male and presents with Cough, Nasal Congestion, and Eye Drainage  . Subjective:  Alanis Vernon presents today with complaint of cough nasal congestion and drainage. He has some mild discomfort in his left ear this been present for the past few days. This is on and off. There are no exacerbating factors. He is not taking any over-the-counter medication for this currently. He has no shortness of breath. He does have a slight cough that is largely nonproductive. He has had no fever or chills. He denies any pleuritic chest pain. Past Medical History:   Diagnosis Date    ADHD (attention deficit hyperactivity disorder)     Allergic rhinitis 10/16/2017    Bipolar disorder ([address] 10/16/2017    Insomnia 10/16/2017    Otitis externa of right ear 10/16/2017    Tourette's 10/16/2017     Past Surgical History:   Procedure Laterality Date    HEENT      TONSILLECTOMY AND ADENOIDECTOMY       Allergies   Allergen Reactions    Sulfa Antibiotics Anaphylaxis    Sulfamethoxazole-Trimethoprim Anaphylaxis     Patient given Bactrim DS this morning for sinus infection. Sister called this afternoon patient is in ER with severe reaction; Dr. Doran Pulse notified    Loracarbef Hives    Cefdinir      Other reaction(s): Unknown (comments)     Current Outpatient Medications   Medication Sig Dispense Refill    pimozide (ORAP) 2 MG tablet Take 1 tablet by mouth daily      levoFLOXacin (LEVAQUIN) 500 MG tablet Take 1 tablet by mouth daily for 10 days 10 tablet 0    methylPREDNISolone (MEDROL DOSEPACK) 4 MG tablet Take by mouth. 21 tablet 0    lamoTRIgine (LAMICTAL) 100 MG tablet Take by mouth daily      sertraline (ZOLOFT) 100 MG tablet Take by mouth daily      topiramate (TOPAMAX) 50 MG tablet Take by mouth 2 times daily       No current facility-administered medications for this visit.      Social History     Socioeconomic History    Marital status: Single     Spouse name: None    Number of children: None

## 2023-05-25 NOTE — PROGRESS NOTES
Serg Silva is a 27 y.o. male presenting for Cough, Nasal Congestion, and Eye Drainage  . 1. Have you been to the ER, urgent care clinic since your last visit? Hospitalized since your last visit? No    2. Have you seen or consulted any other health care providers outside of the [address] [place] [address] since your last visit? Include any pap smears or colon screening.  No

## 2024-08-07 NOTE — TELEPHONE ENCOUNTER
Patients mother calling for her son stating he tested positive on 1/1/21 for the covid. He is still running a lower grade fever off & on. Last night it was 100, most of the time it runs a little under a 100 and still has a coughing. Does he need to be rechecked for the covid? Also he needs a letter to return back to work or a letter if he is still needs to stay home. Please advise.
Treatment Goal Explanation (Does Not Render In The Note): Stable for the purposes of categorizing medical decision making is defined by the specific treatment goals for an individual patient. A patient that is not at their treatment goal is not stable, even if the condition has not changed and there is no short- term threat to life or function.

## 2024-11-05 ENCOUNTER — OFFICE VISIT (OUTPATIENT)
Facility: CLINIC | Age: 34
End: 2024-11-05
Payer: COMMERCIAL

## 2024-11-05 VITALS
OXYGEN SATURATION: 96 % | RESPIRATION RATE: 17 BRPM | SYSTOLIC BLOOD PRESSURE: 107 MMHG | BODY MASS INDEX: 37.47 KG/M2 | HEIGHT: 68 IN | DIASTOLIC BLOOD PRESSURE: 72 MMHG | TEMPERATURE: 98.2 F | WEIGHT: 247.2 LBS | HEART RATE: 73 BPM

## 2024-11-05 DIAGNOSIS — J01.00 ACUTE MAXILLARY SINUSITIS, RECURRENCE NOT SPECIFIED: Primary | ICD-10-CM

## 2024-11-05 PROCEDURE — 99213 OFFICE O/P EST LOW 20 MIN: CPT | Performed by: INTERNAL MEDICINE

## 2024-11-05 RX ORDER — DOXYCYCLINE HYCLATE 100 MG
100 TABLET ORAL 2 TIMES DAILY
Qty: 20 TABLET | Refills: 0 | Status: SHIPPED | OUTPATIENT
Start: 2024-11-05 | End: 2024-11-15

## 2024-11-05 SDOH — ECONOMIC STABILITY: FOOD INSECURITY: WITHIN THE PAST 12 MONTHS, THE FOOD YOU BOUGHT JUST DIDN'T LAST AND YOU DIDN'T HAVE MONEY TO GET MORE.: NEVER TRUE

## 2024-11-05 SDOH — ECONOMIC STABILITY: FOOD INSECURITY: WITHIN THE PAST 12 MONTHS, YOU WORRIED THAT YOUR FOOD WOULD RUN OUT BEFORE YOU GOT MONEY TO BUY MORE.: NEVER TRUE

## 2024-11-05 SDOH — ECONOMIC STABILITY: INCOME INSECURITY: HOW HARD IS IT FOR YOU TO PAY FOR THE VERY BASICS LIKE FOOD, HOUSING, MEDICAL CARE, AND HEATING?: NOT HARD AT ALL

## 2024-11-05 ASSESSMENT — PATIENT HEALTH QUESTIONNAIRE - PHQ9
3. TROUBLE FALLING OR STAYING ASLEEP: NOT AT ALL
10. IF YOU CHECKED OFF ANY PROBLEMS, HOW DIFFICULT HAVE THESE PROBLEMS MADE IT FOR YOU TO DO YOUR WORK, TAKE CARE OF THINGS AT HOME, OR GET ALONG WITH OTHER PEOPLE: NOT DIFFICULT AT ALL
2. FEELING DOWN, DEPRESSED OR HOPELESS: NOT AT ALL
7. TROUBLE CONCENTRATING ON THINGS, SUCH AS READING THE NEWSPAPER OR WATCHING TELEVISION: NOT AT ALL
SUM OF ALL RESPONSES TO PHQ9 QUESTIONS 1 & 2: 0
SUM OF ALL RESPONSES TO PHQ QUESTIONS 1-9: 0
SUM OF ALL RESPONSES TO PHQ QUESTIONS 1-9: 0
9. THOUGHTS THAT YOU WOULD BE BETTER OFF DEAD, OR OF HURTING YOURSELF: NOT AT ALL
SUM OF ALL RESPONSES TO PHQ QUESTIONS 1-9: 0
6. FEELING BAD ABOUT YOURSELF - OR THAT YOU ARE A FAILURE OR HAVE LET YOURSELF OR YOUR FAMILY DOWN: NOT AT ALL
SUM OF ALL RESPONSES TO PHQ QUESTIONS 1-9: 0
4. FEELING TIRED OR HAVING LITTLE ENERGY: NOT AT ALL
1. LITTLE INTEREST OR PLEASURE IN DOING THINGS: NOT AT ALL
5. POOR APPETITE OR OVEREATING: NOT AT ALL
8. MOVING OR SPEAKING SO SLOWLY THAT OTHER PEOPLE COULD HAVE NOTICED. OR THE OPPOSITE, BEING SO FIGETY OR RESTLESS THAT YOU HAVE BEEN MOVING AROUND A LOT MORE THAN USUAL: NOT AT ALL

## 2024-11-05 NOTE — PROGRESS NOTES
Narendra Chicas is a 34 y.o. male and presents with post cold pt c/o of earache with pressure, maybe sinus infe  .    Subjective:  Narendra presents today with left ear discomfort and sinus congestion and pressure.  He has congestion has been present for about 2 to 3 weeks now.  He has taken over-the-counter cold medicine with some relief but started to develop left ear discomfort for which he presents today for further evaluation.  He reports no fever or chills.  He notes sinus pressure with minimal drainage.    Past Medical History:   Diagnosis Date    ADHD (attention deficit hyperactivity disorder)     Allergic rhinitis 10/16/2017    Bipolar disorder ([facility]) 10/16/2017    Insomnia 10/16/2017    Otitis externa of right ear 10/16/2017    Tourette's 10/16/2017     Past Surgical History:   Procedure Laterality Date    HEENT      TONSILLECTOMY AND ADENOIDECTOMY       Allergies   Allergen Reactions    Sulfa Antibiotics Anaphylaxis    Sulfamethoxazole-Trimethoprim Anaphylaxis     Patient given Bactrim DS this morning for sinus infection.  Sister called this afternoon patient is in ER with severe reaction; Dr. Blackwell notified    Loracarbef Hives    Cefdinir      Other reaction(s): Unknown (comments)     Current Outpatient Medications   Medication Sig Dispense Refill    doxycycline hyclate (VIBRA-TABS) 100 MG tablet Take 1 tablet by mouth 2 times daily for 10 days 20 tablet 0    pimozide (ORAP) 2 MG tablet Take 1 tablet by mouth daily      lamoTRIgine (LAMICTAL) 100 MG tablet Take by mouth daily      sertraline (ZOLOFT) 100 MG tablet Take by mouth daily      topiramate (TOPAMAX) 50 MG tablet Take by mouth 2 times daily       No current facility-administered medications for this visit.     Social History     Socioeconomic History    Marital status: Single   Tobacco Use    Smoking status: Never    Smokeless tobacco: Never   Vaping Use    Vaping status: Never Used   Substance and Sexual Activity    Alcohol use: Yes    Drug

## 2024-11-05 NOTE — PROGRESS NOTES
Narendra Chicas is a 34 y.o. male     Chief Complaint   Patient presents with    post cold pt c/o of earache with pressure, maybe sinus infe       /72 (Site: Left Upper Arm, Position: Sitting, Cuff Size: Medium Adult)   Pulse 73   Temp 98.2 °F (36.8 °C) (Temporal)   Resp 17   Ht 1.727 m (5' 8\")   Wt 112.1 kg (247 lb 3.2 oz)   SpO2 96%   BMI 37.59 kg/m²     Health Maintenance Due   Topic Date Due    Depression Monitoring  Never done    Varicella vaccine (1 of 2 - 13+ 2-dose series) Never done    HIV screen  Never done    Hepatitis C screen  Never done    Hepatitis B vaccine (1 of 3 - 19+ 3-dose series) Never done    DTaP/Tdap/Td vaccine (1 - Tdap) Never done    COVID-19 Vaccine (3 - 2023-24 season) 09/01/2024         \"Have you been to the ER, urgent care clinic since your last visit?  Hospitalized since your last visit?\"    NO    “Have you seen or consulted any other health care providers outside of [facility] since your last visit?”    NO

## 2025-05-06 ENCOUNTER — HOSPITAL ENCOUNTER (OUTPATIENT)
Facility: HOSPITAL | Age: 35
Discharge: HOME | End: 2025-05-09

## 2025-07-10 ENCOUNTER — PATIENT MESSAGE (OUTPATIENT)
Facility: CLINIC | Age: 35
End: 2025-07-10

## 2025-07-21 SDOH — ECONOMIC STABILITY: TRANSPORTATION INSECURITY
IN THE PAST 12 MONTHS, HAS THE LACK OF TRANSPORTATION KEPT YOU FROM MEDICAL APPOINTMENTS OR FROM GETTING MEDICATIONS?: NO

## 2025-07-21 SDOH — ECONOMIC STABILITY: FOOD INSECURITY: WITHIN THE PAST 12 MONTHS, YOU WORRIED THAT YOUR FOOD WOULD RUN OUT BEFORE YOU GOT MONEY TO BUY MORE.: NEVER TRUE

## 2025-07-21 SDOH — ECONOMIC STABILITY: INCOME INSECURITY: IN THE LAST 12 MONTHS, WAS THERE A TIME WHEN YOU WERE NOT ABLE TO PAY THE MORTGAGE OR RENT ON TIME?: NO

## 2025-07-21 SDOH — ECONOMIC STABILITY: FOOD INSECURITY: WITHIN THE PAST 12 MONTHS, THE FOOD YOU BOUGHT JUST DIDN'T LAST AND YOU DIDN'T HAVE MONEY TO GET MORE.: NEVER TRUE

## 2025-07-22 ENCOUNTER — OFFICE VISIT (OUTPATIENT)
Facility: CLINIC | Age: 35
End: 2025-07-22
Payer: COMMERCIAL

## 2025-07-22 VITALS
RESPIRATION RATE: 18 BRPM | WEIGHT: 246.8 LBS | BODY MASS INDEX: 37.41 KG/M2 | TEMPERATURE: 98.1 F | HEIGHT: 68 IN | HEART RATE: 75 BPM | OXYGEN SATURATION: 98 %

## 2025-07-22 DIAGNOSIS — R06.02 SHORTNESS OF BREATH: Primary | ICD-10-CM

## 2025-07-22 DIAGNOSIS — F31.61 BIPOLAR DISORDER, CURRENT EPISODE MIXED, MILD (HCC): ICD-10-CM

## 2025-07-22 PROCEDURE — 99214 OFFICE O/P EST MOD 30 MIN: CPT | Performed by: INTERNAL MEDICINE

## 2025-07-22 ASSESSMENT — PATIENT HEALTH QUESTIONNAIRE - PHQ9
6. FEELING BAD ABOUT YOURSELF - OR THAT YOU ARE A FAILURE OR HAVE LET YOURSELF OR YOUR FAMILY DOWN: NOT AT ALL
SUM OF ALL RESPONSES TO PHQ QUESTIONS 1-9: 0
9. THOUGHTS THAT YOU WOULD BE BETTER OFF DEAD, OR OF HURTING YOURSELF: NOT AT ALL
2. FEELING DOWN, DEPRESSED OR HOPELESS: NOT AT ALL
8. MOVING OR SPEAKING SO SLOWLY THAT OTHER PEOPLE COULD HAVE NOTICED. OR THE OPPOSITE, BEING SO FIGETY OR RESTLESS THAT YOU HAVE BEEN MOVING AROUND A LOT MORE THAN USUAL: NOT AT ALL
SUM OF ALL RESPONSES TO PHQ QUESTIONS 1-9: 0
SUM OF ALL RESPONSES TO PHQ QUESTIONS 1-9: 0
5. POOR APPETITE OR OVEREATING: NOT AT ALL
SUM OF ALL RESPONSES TO PHQ QUESTIONS 1-9: 0
7. TROUBLE CONCENTRATING ON THINGS, SUCH AS READING THE NEWSPAPER OR WATCHING TELEVISION: NOT AT ALL
1. LITTLE INTEREST OR PLEASURE IN DOING THINGS: NOT AT ALL
3. TROUBLE FALLING OR STAYING ASLEEP: NOT AT ALL
4. FEELING TIRED OR HAVING LITTLE ENERGY: NOT AT ALL
10. IF YOU CHECKED OFF ANY PROBLEMS, HOW DIFFICULT HAVE THESE PROBLEMS MADE IT FOR YOU TO DO YOUR WORK, TAKE CARE OF THINGS AT HOME, OR GET ALONG WITH OTHER PEOPLE: NOT DIFFICULT AT ALL

## 2025-08-19 ENCOUNTER — TELEPHONE (OUTPATIENT)
Facility: CLINIC | Age: 35
End: 2025-08-19

## 2025-08-19 ENCOUNTER — HOSPITAL ENCOUNTER (OUTPATIENT)
Facility: HOSPITAL | Age: 35
Discharge: HOME | End: 2025-08-21
Attending: INTERNAL MEDICINE
Payer: COMMERCIAL

## 2025-08-19 DIAGNOSIS — R06.02 SHORTNESS OF BREATH: ICD-10-CM

## 2025-08-19 PROCEDURE — 93306 TTE W/DOPPLER COMPLETE: CPT

## 2025-08-20 LAB
ECHO AO ROOT DIAM: 2.5 CM
ECHO AV PEAK GRADIENT: 5 MMHG
ECHO AV PEAK VELOCITY: 1.1 M/S
ECHO AV VELOCITY RATIO: 1
ECHO LA DIAMETER: 3.2 CM
ECHO LA TO AORTIC ROOT RATIO: 1.28
ECHO LV E' LATERAL VELOCITY: 8.71 CM/S
ECHO LV E' SEPTAL VELOCITY: 9.89 CM/S
ECHO LV EF PHYSICIAN: 60 %
ECHO LV FRACTIONAL SHORTENING: 52 % (ref 28–44)
ECHO LV INTERNAL DIMENSION DIASTOLIC: 5 CM (ref 4.2–5.9)
ECHO LV INTERNAL DIMENSION SYSTOLIC: 2.4 CM
ECHO LV IVSD: 0.5 CM (ref 0.6–1)
ECHO LV MASS 2D: 104.6 G (ref 88–224)
ECHO LV POSTERIOR WALL DIASTOLIC: 0.8 CM (ref 0.6–1)
ECHO LV RELATIVE WALL THICKNESS RATIO: 0.32
ECHO LVOT PEAK GRADIENT: 4 MMHG
ECHO LVOT PEAK VELOCITY: 1.1 M/S
ECHO MV A VELOCITY: 0.52 M/S
ECHO MV E DECELERATION TIME (DT): 361.1 MS
ECHO MV E VELOCITY: 0.94 M/S
ECHO MV E/A RATIO: 1.81
ECHO MV E/E' LATERAL: 10.79
ECHO MV E/E' RATIO (AVERAGED): 10.15
ECHO MV E/E' SEPTAL: 9.5
ECHO TV REGURGITANT MAX VELOCITY: 2.11 M/S
ECHO TV REGURGITANT PEAK GRADIENT: 18 MMHG

## 2025-09-02 ENCOUNTER — OFFICE VISIT (OUTPATIENT)
Facility: CLINIC | Age: 35
End: 2025-09-02
Payer: COMMERCIAL

## 2025-09-02 VITALS
RESPIRATION RATE: 18 BRPM | TEMPERATURE: 97.3 F | SYSTOLIC BLOOD PRESSURE: 104 MMHG | BODY MASS INDEX: 37.62 KG/M2 | HEIGHT: 68 IN | DIASTOLIC BLOOD PRESSURE: 70 MMHG | WEIGHT: 248.2 LBS | HEART RATE: 65 BPM | OXYGEN SATURATION: 98 %

## 2025-09-02 DIAGNOSIS — R42 DIZZINESS: ICD-10-CM

## 2025-09-02 DIAGNOSIS — R06.02 SHORTNESS OF BREATH: Primary | ICD-10-CM

## 2025-09-02 PROCEDURE — 99213 OFFICE O/P EST LOW 20 MIN: CPT | Performed by: INTERNAL MEDICINE
